# Patient Record
Sex: FEMALE | Race: WHITE | NOT HISPANIC OR LATINO | URBAN - METROPOLITAN AREA
[De-identification: names, ages, dates, MRNs, and addresses within clinical notes are randomized per-mention and may not be internally consistent; named-entity substitution may affect disease eponyms.]

---

## 2019-11-30 ENCOUNTER — EMERGENCY (EMERGENCY)
Facility: HOSPITAL | Age: 44
LOS: 0 days | Discharge: HOME | End: 2019-11-30
Admitting: EMERGENCY MEDICINE
Payer: COMMERCIAL

## 2019-11-30 VITALS
HEIGHT: 65 IN | RESPIRATION RATE: 16 BRPM | OXYGEN SATURATION: 98 % | DIASTOLIC BLOOD PRESSURE: 103 MMHG | HEART RATE: 90 BPM | WEIGHT: 214.95 LBS | SYSTOLIC BLOOD PRESSURE: 173 MMHG | TEMPERATURE: 99 F
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
## 2019-11-30 DIAGNOSIS — R07.89 OTHER CHEST PAIN: ICD-10-CM
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
## 2019-11-30 PROCEDURE — 71046 X-RAY EXAM CHEST 2 VIEWS: CPT | Mod: 26

## 2019-11-30 PROCEDURE — 99284 EMERGENCY DEPT VISIT MOD MDM: CPT

## 2019-11-30 RX ORDER — IBUPROFEN 200 MG
600 TABLET ORAL ONCE
Refills: 0 | Status: COMPLETED | OUTPATIENT
Start: 2019-11-30 | End: 2019-11-30

## 2019-11-30 RX ADMIN — Medication 600 MILLIGRAM(S): at 21:17
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
## 2019-11-30 NOTE — ED PROVIDER NOTE - NS ED ROS FT
Review of Systems:  	•	CONSTITUTIONAL - no fever, no diaphoresis, no chills  	•	SKIN - no rash  	•	HEMATOLOGIC - no bleeding, no bruising  	•	EYES - no eye pain, no blurry vision  	•	ENT - no change in hearing, no sore throat, no ear pain or tinnitus  	•	RESPIRATORY - no shortness of breath, no cough  	•	CARDIAC - no palpitations  	•	GI - no abd pain, no nausea, no vomiting, no diarrhea, no constipation  	•	GENITO-URINARY - no discharge, no dysuria; no hematuria, no increased urinary frequency  	•	MUSCULOSKELETAL - + midsternal chest pain  	•	NEUROLOGIC - no weakness, no headache, no paresthesias, no LOC

## 2019-11-30 NOTE — ED PROVIDER NOTE - PHYSICAL EXAMINATION
CONST: Well appearing in NAD  EYES: PERRL, EOMI, Sclera and conjunctiva clear.   ENT: No nasal discharge. Oropharynx normal appearing  NECK: Non-tender, no meningeal signs. normal ROM. supple   CARD: Normal S1 S2; Normal rate and rhythm  RESP: Equal BS B/L, No wheezes, rhonchi or rales. No distress  GI: Soft, non-tender, non-distended. no cva tenderness. normal BS  MS: Normal ROM in all extremities. No midline spinal tenderness. pulses 2 +. no calf tenderness or swelling  SKIN: Warm, dry, no acute rashes. Good turgor  NEURO: A&Ox3, No focal deficits. Strength 5/5 with no sensory deficits. Steady gait. Finger to nose intact. Negative pronator drift

## 2019-11-30 NOTE — ED PROVIDER NOTE - PATIENT PORTAL LINK FT
You can access the FollowMyHealth Patient Portal offered by [facility] by registering at the following website: [email or web address]. By joining [facility]’s FollowMyHealth portal, you will also be able to view your health information using other applications (apps) compatible with our system.
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
## 2019-11-30 NOTE — ED ADULT NURSE NOTE - NSIMPLEMENTINTERV_GEN_ALL_ED
Implemented All Universal Safety Interventions:  [place] to call system. Call bell, personal items and telephone within reach. Instruct patient to call for assistance. Room bathroom lighting operational. Non-slip footwear when patient is off stretcher. Physically safe environment: no spills, clutter or unnecessary equipment. Stretcher in lowest position, wheels locked, appropriate side rails in place.

## 2021-04-02 ENCOUNTER — INPATIENT (INPATIENT)
Facility: HOSPITAL | Age: 46
LOS: 7 days | Discharge: LEFT BEFORE DISCHARGE | End: 2021-04-10
Attending: INTERNAL MEDICINE | Admitting: INTERNAL MEDICINE
Payer: COMMERCIAL

## 2021-04-02 VITALS
DIASTOLIC BLOOD PRESSURE: 78 MMHG | RESPIRATION RATE: 19 BRPM | OXYGEN SATURATION: 95 % | HEIGHT: 65 IN | HEART RATE: 97 BPM | SYSTOLIC BLOOD PRESSURE: 112 MMHG

## 2021-04-02 LAB
ACANTHOCYTES BLD QL SMEAR: SLIGHT — SIGNIFICANT CHANGE UP
ALBUMIN SERPL ELPH-MCNC: 3.9 G/DL — SIGNIFICANT CHANGE UP (ref 3.5–5.2)
ALP SERPL-CCNC: 60 U/L — SIGNIFICANT CHANGE UP (ref 30–115)
ALT FLD-CCNC: 134 U/L — HIGH (ref 0–41)
ANION GAP SERPL CALC-SCNC: 14 MMOL/L — SIGNIFICANT CHANGE UP (ref 7–14)
AST SERPL-CCNC: 208 U/L — HIGH (ref 0–41)
BASOPHILS # BLD AUTO: 0 K/UL — SIGNIFICANT CHANGE UP (ref 0–0.2)
BASOPHILS NFR BLD AUTO: 0 % — SIGNIFICANT CHANGE UP (ref 0–1)
BILIRUB SERPL-MCNC: <0.2 MG/DL — SIGNIFICANT CHANGE UP (ref 0.2–1.2)
BUN SERPL-MCNC: 10 MG/DL — SIGNIFICANT CHANGE UP (ref 10–20)
CALCIUM SERPL-MCNC: 8.5 MG/DL — SIGNIFICANT CHANGE UP (ref 8.5–10.1)
CHLORIDE SERPL-SCNC: 102 MMOL/L — SIGNIFICANT CHANGE UP (ref 98–110)
CO2 SERPL-SCNC: 24 MMOL/L — SIGNIFICANT CHANGE UP (ref 17–32)
CREAT SERPL-MCNC: 0.7 MG/DL — SIGNIFICANT CHANGE UP (ref 0.7–1.5)
D DIMER BLD IA.RAPID-MCNC: 167 NG/ML DDU — SIGNIFICANT CHANGE UP (ref 0–230)
EOSINOPHIL # BLD AUTO: 0 K/UL — SIGNIFICANT CHANGE UP (ref 0–0.7)
EOSINOPHIL NFR BLD AUTO: 0 % — SIGNIFICANT CHANGE UP (ref 0–8)
GIANT PLATELETS BLD QL SMEAR: PRESENT — SIGNIFICANT CHANGE UP
GLUCOSE SERPL-MCNC: 145 MG/DL — HIGH (ref 70–99)
HCT VFR BLD CALC: 43 % — SIGNIFICANT CHANGE UP (ref 37–47)
HGB BLD-MCNC: 13.8 G/DL — SIGNIFICANT CHANGE UP (ref 12–16)
LYMPHOCYTES # BLD AUTO: 0.35 K/UL — LOW (ref 1.2–3.4)
LYMPHOCYTES # BLD AUTO: 11.3 % — LOW (ref 20.5–51.1)
MANUAL SMEAR VERIFICATION: SIGNIFICANT CHANGE UP
MCHC RBC-ENTMCNC: 27.9 PG — SIGNIFICANT CHANGE UP (ref 27–31)
MCHC RBC-ENTMCNC: 32.1 G/DL — SIGNIFICANT CHANGE UP (ref 32–37)
MCV RBC AUTO: 87 FL — SIGNIFICANT CHANGE UP (ref 81–99)
MONOCYTES # BLD AUTO: 0.05 K/UL — LOW (ref 0.1–0.6)
MONOCYTES NFR BLD AUTO: 1.7 % — SIGNIFICANT CHANGE UP (ref 1.7–9.3)
NEUTROPHILS # BLD AUTO: 2.64 K/UL — SIGNIFICANT CHANGE UP (ref 1.4–6.5)
NEUTROPHILS NFR BLD AUTO: 84.4 % — HIGH (ref 42.2–75.2)
PLAT MORPH BLD: NORMAL — SIGNIFICANT CHANGE UP
PLATELET # BLD AUTO: 200 K/UL — SIGNIFICANT CHANGE UP (ref 130–400)
POTASSIUM SERPL-MCNC: 4.2 MMOL/L — SIGNIFICANT CHANGE UP (ref 3.5–5)
POTASSIUM SERPL-SCNC: 4.2 MMOL/L — SIGNIFICANT CHANGE UP (ref 3.5–5)
PROT SERPL-MCNC: 6.7 G/DL — SIGNIFICANT CHANGE UP (ref 6–8)
RBC # BLD: 4.94 M/UL — SIGNIFICANT CHANGE UP (ref 4.2–5.4)
RBC # FLD: 13.6 % — SIGNIFICANT CHANGE UP (ref 11.5–14.5)
RBC BLD AUTO: NORMAL — SIGNIFICANT CHANGE UP
SARS-COV-2 RNA SPEC QL NAA+PROBE: DETECTED
SODIUM SERPL-SCNC: 140 MMOL/L — SIGNIFICANT CHANGE UP (ref 135–146)
VARIANT LYMPHS # BLD: 2.6 % — SIGNIFICANT CHANGE UP (ref 0–5)
WBC # BLD: 3.13 K/UL — LOW (ref 4.8–10.8)
WBC # FLD AUTO: 3.13 K/UL — LOW (ref 4.8–10.8)

## 2021-04-02 PROCEDURE — 99223 1ST HOSP IP/OBS HIGH 75: CPT | Mod: AI

## 2021-04-02 PROCEDURE — 93010 ELECTROCARDIOGRAM REPORT: CPT

## 2021-04-02 PROCEDURE — 99285 EMERGENCY DEPT VISIT HI MDM: CPT

## 2021-04-02 PROCEDURE — 71045 X-RAY EXAM CHEST 1 VIEW: CPT | Mod: 26

## 2021-04-02 RX ORDER — DEXAMETHASONE 0.5 MG/5ML
6 ELIXIR ORAL DAILY
Refills: 0 | Status: DISCONTINUED | OUTPATIENT
Start: 2021-04-02 | End: 2021-04-03

## 2021-04-02 RX ORDER — ACETAMINOPHEN 500 MG
975 TABLET ORAL ONCE
Refills: 0 | Status: COMPLETED | OUTPATIENT
Start: 2021-04-02 | End: 2021-04-02

## 2021-04-02 RX ORDER — ACETAMINOPHEN 500 MG
650 TABLET ORAL EVERY 6 HOURS
Refills: 0 | Status: DISCONTINUED | OUTPATIENT
Start: 2021-04-02 | End: 2021-04-10

## 2021-04-02 RX ORDER — PANTOPRAZOLE SODIUM 20 MG/1
40 TABLET, DELAYED RELEASE ORAL
Refills: 0 | Status: DISCONTINUED | OUTPATIENT
Start: 2021-04-02 | End: 2021-04-03

## 2021-04-02 RX ORDER — ENOXAPARIN SODIUM 100 MG/ML
40 INJECTION SUBCUTANEOUS AT BEDTIME
Refills: 0 | Status: DISCONTINUED | OUTPATIENT
Start: 2021-04-02 | End: 2021-04-03

## 2021-04-02 RX ORDER — CHLORHEXIDINE GLUCONATE 213 G/1000ML
1 SOLUTION TOPICAL
Refills: 0 | Status: DISCONTINUED | OUTPATIENT
Start: 2021-04-02 | End: 2021-04-10

## 2021-04-02 RX ADMIN — Medication 975 MILLIGRAM(S): at 16:19

## 2021-04-02 RX ADMIN — ENOXAPARIN SODIUM 40 MILLIGRAM(S): 100 INJECTION SUBCUTANEOUS at 23:15

## 2021-04-02 RX ADMIN — Medication 975 MILLIGRAM(S): at 13:50

## 2021-04-02 RX ADMIN — Medication 6 MILLIGRAM(S): at 16:58

## 2021-04-02 NOTE — ED PROVIDER NOTE - ATTENDING CONTRIBUTION TO CARE
45F p/w fever with Tm 103 and SOB that started 1 week ago. Reports loose diarrhea for a few days and 1 episode of vomiting yesterday nbnb. Denies abd pain, flank pain, urinary sx. pt desaturates ton mid-80s on RA, improved to 89-91% on 4.5L NC.     Gen - pt not ill appearing, in NAD, Head - NCAT, Pharynx - clear, MMM, Heart - RRR, no m/g/r, Lungs - CTAB, no w/c/r, Abdomen - soft, NT, ND, Skin - No rash, Extremities - FROM, no edema, erythema, ecchymosis, brisk cap refill, Neuro - nl strength and sensation, nl gait.     a/p: will eval for hypoxia 2/2 covid? ekg, cxr, labs, covid swab sent, will give ivf, decadron, and dispo admit

## 2021-04-02 NOTE — ED PROVIDER NOTE - CLINICAL SUMMARY MEDICAL DECISION MAKING FREE TEXT BOX
45F p/w fever with Tm 103 and SOB that started 1 week ago. Reports loose diarrhea for a few days and 1 episode of vomiting yesterday nbnb. Denies abd pain, flank pain, urinary sx. pt desaturates ton mid-80s on RA, improved to 89-91% on 4.5L NC. Labs and imaging reviewed, b/l opacities on cxr. Decadron given and pt admitted for hypoxia d/t covid pneumonia.

## 2021-04-02 NOTE — H&P ADULT - NSHPPHYSICALEXAM_GEN_ALL_CORE
General: NAD  Head and Neck: NC, AT, No JVD   Heart: S1, S2 appreciated, no added sounds  Lung: Bilateral equal breath sounds, no added sounds   Abdomen: Soft, nontender, nondistended, BS +ve  LE: no edema   Neuro: nonfocal   Psych: normal affect

## 2021-04-02 NOTE — H&P ADULT - NSHPLABSRESULTS_GEN_ALL_CORE
13.8   3.13  )-----------( 200      ( 02 Apr 2021 14:20 )             43.0       04-02    140  |  102  |  10  ----------------------------<  145<H>  4.2   |  24  |  0.7    Ca    8.5      02 Apr 2021 14:20    TPro  6.7  /  Alb  3.9  /  TBili  <0.2  /  DBili  x   /  AST  208<H>  /  ALT  134<H>  /  AlkPhos  60  04-02                      Lactate Trend            CAPILLARY BLOOD GLUCOSE

## 2021-04-02 NOTE — ED PROVIDER NOTE - NS ED ROS FT
Constitutional: (+) fever  Eyes/ENT: (-) visual changes   Cardiovascular: (-) chest pain, (-) syncope  Respiratory: (+) cough, (+) shortness of breath  Gastrointestinal: (-) vomiting, (-) diarrhea  Genitourinary: (-) dysuria, (-) hesitancy, (-) frequency   Musculoskeletal: (-) neck pain, (-) back pain, (-) joint pain  Integumentary: (-) rash, (-) edema  Neurological: (-) headache, (-) altered mental status  Allergic/Immunologic: (-) pruritus

## 2021-04-02 NOTE — ED PROVIDER NOTE - OBJECTIVE STATEMENT
44 yo female with no pertinent pmh presents c/o SOB. pt states to noted symptoms of fever/cough on 3/26 and tested covid+ on 3/28. pt states to experience SOB yesterday and was noted to be hypoxic at urgent care so sent over to the ED. pt denies any other symptoms including headache, abdominal pain, n/v/d, chest pain.

## 2021-04-02 NOTE — ED PROVIDER NOTE - PHYSICAL EXAMINATION
Gen: NAD, AOx3  Head: NCAT  HEENT: PERRL, oral mucosa moist, normal conjunctiva, oropharynx clear without exudate or erythema  Lung: CTAB, no respiratory distress, no wheezing, rales, rhonchi, spO2 83% at rest on RA  CV: normal s1/s2, rrr, Normal perfusion, pulses 2+ throughout  Abd: soft, NTND, no CVA tenderness  Genitourinary: no pelvic tenderness  MSK: No edema, no visible deformities, full range of motion in all 4 extremities  Neuro: No focal neurologic deficits  Skin: No rash   Psych: normal affect

## 2021-04-02 NOTE — H&P ADULT - ATTENDING COMMENTS
46 YO F with a PMH of COVID19 (3/28) who was [place] [facility] in the 80's on RA, she originally presented there with a c/o progressively worsening SOB for the past x 1 week. Associated with fevers, N/V, and non-productive cough. Denies any runny nose, sore throat, rashes, CP, palpitations, LE swelling, and dysuria. - sick contacts. - recent travel.     In the ED, Chest X-ray with B/L interstitial opacities (pending official read). Hypoxic to mid 80's on RA, placed on NC. Isolated and COVID19 swab was positive.     Physical exam shows pt in NAD. VSS, afebrile, not hypoxic on 8L NC. A&Ox3. Non-focal neuro exam. Muscle strength/sensation intact. CTA B/L with no W/C/R. RRR, no M/G/R. ABD is soft and non-tender, normoactive BSs. LEs without swelling. No rashes. Labs and radiology as above.     SOB + Fevers + Cough due to COVID19 pneumonia + acute hypoxic respiratory failure, sepsis present on admission (Temp + WBC + source + end organ [AHRF]). - recent travel. - COVID19 contact. Admit to COVID19 isolation unit. Send Coags, CRP, procal, D-dimer, and LDH. Trend CBC, Ck, and LFTs. Send ferritin and fibrinogen. FU official Chest XR report. IV Steroids. Remdesivir/Plasma protocol. Plasma Abs. APAP PRN. Anti-tussives PRN. Supplemental O2 PRN. Prone pt as tolerated. AC as per [facility] COVID protocol. ID Consult.     Lymphocytopenia and transaminitis, from above. Management as above.     Hyperglycemia. A1c. FSs. Insulin PRN.     Restart home meds, except as stated above. DVT PPX. Inform PCP of pt's admission to hospital. My note supersedes the residents note.

## 2021-04-02 NOTE — H&P ADULT - ASSESSMENT
This is a 45 year old F patient with no pertinent past medical Hx presented to the ED for shortness of breath of 1 week duration.    #Acute hypoxic respiratory failure secondary to COVID PNA  -Patient was positive for COVID-19 on Sunday 03/28  -Patient SpO2 was 83% at rest, 95% on 6L NC  -Chest xray showed Bilateral intersitial infiltrate with no effusion   -Start Decadron 6 mg PO daily for 10 days   -Follow up inflammatory markers   -Titrate O2 as tolerated   -ID consult     #DVT ppx: Lovenox 40 mg SubQ daily   #GI ppx: Protonix   #Diet: Regular   #Acute

## 2021-04-02 NOTE — H&P ADULT - HISTORY OF PRESENT ILLNESS
This is a 45 year old F patient with no pertinent past medical Hx presented to the ED for shortness of breath of 1 week duration. Patient was doing well until one week prior to admission when she started to experience exertional shortness of breath on friday 03/26/2021. Patient stated that the dyspnea got progressively worse but is mostly exertional. It was associated with fever (undocumented) and nonproductive cough. Patient was tested for COVID and it was positive two days after the onset of symptoms. she also experienced one episode of vomiting and was having diarrhea for 2 days. Patient denied any chest pain except when she has a spell of cough that resolve completely after the cough stops. Patient denied any lightheadedness, headache or any other symptoms. Patient went to Urgent care on the day of admission and was found to have SpO2 of 83% so she was sent to the ED for further management.     In the ED Vital Signs Last 24 Hrs  T(C): 37.2 (02 Apr 2021 16:18), Max: 38.6 (02 Apr 2021 13:43)  T(F): 99 (02 Apr 2021 16:18), Max: 101.5 (02 Apr 2021 13:43)  HR: 88 (02 Apr 2021 16:18) (88 - 97)  BP: 119/75 (02 Apr 2021 16:18) (112/78 - 119/75)  BP(mean): --  RR: 19 (02 Apr 2021 16:18) (19 - 19)  SpO2: 93% (02 Apr 2021 16:18) (93% - 95%)

## 2021-04-03 LAB
ALBUMIN SERPL ELPH-MCNC: 3.9 G/DL — SIGNIFICANT CHANGE UP (ref 3.5–5.2)
ALP SERPL-CCNC: 61 U/L — SIGNIFICANT CHANGE UP (ref 30–115)
ALT FLD-CCNC: 107 U/L — HIGH (ref 0–41)
ANION GAP SERPL CALC-SCNC: 12 MMOL/L — SIGNIFICANT CHANGE UP (ref 7–14)
APTT BLD: 36.1 SEC — SIGNIFICANT CHANGE UP (ref 27–39.2)
AST SERPL-CCNC: 127 U/L — HIGH (ref 0–41)
BASE EXCESS BLDA CALC-SCNC: 5.3 MMOL/L — HIGH (ref -2–2)
BILIRUB SERPL-MCNC: 0.2 MG/DL — SIGNIFICANT CHANGE UP (ref 0.2–1.2)
BLD GP AB SCN SERPL QL: SIGNIFICANT CHANGE UP
BUN SERPL-MCNC: 17 MG/DL — SIGNIFICANT CHANGE UP (ref 10–20)
CALCIUM SERPL-MCNC: 8.5 MG/DL — SIGNIFICANT CHANGE UP (ref 8.5–10.1)
CHLORIDE SERPL-SCNC: 100 MMOL/L — SIGNIFICANT CHANGE UP (ref 98–110)
CO2 SERPL-SCNC: 29 MMOL/L — SIGNIFICANT CHANGE UP (ref 17–32)
COVID-19 SPIKE DOMAIN AB INTERP: NEGATIVE — SIGNIFICANT CHANGE UP
COVID-19 SPIKE DOMAIN ANTIBODY RESULT: 0.4 U/ML — SIGNIFICANT CHANGE UP
CREAT SERPL-MCNC: 0.8 MG/DL — SIGNIFICANT CHANGE UP (ref 0.7–1.5)
CRP SERPL-MCNC: 73 MG/L — HIGH
FERRITIN SERPL-MCNC: 3872 NG/ML — HIGH (ref 15–150)
GAS PNL BLDA: SIGNIFICANT CHANGE UP
GLUCOSE SERPL-MCNC: 246 MG/DL — HIGH (ref 70–99)
HCO3 BLDA-SCNC: 30 MMOL/L — HIGH (ref 23–27)
HCT VFR BLD CALC: 41.1 % — SIGNIFICANT CHANGE UP (ref 37–47)
HGB BLD-MCNC: 13.2 G/DL — SIGNIFICANT CHANGE UP (ref 12–16)
HOROWITZ INDEX BLDA+IHG-RTO: 100 — SIGNIFICANT CHANGE UP
INR BLD: 1.07 RATIO — SIGNIFICANT CHANGE UP (ref 0.65–1.3)
MCHC RBC-ENTMCNC: 27.7 PG — SIGNIFICANT CHANGE UP (ref 27–31)
MCHC RBC-ENTMCNC: 32.1 G/DL — SIGNIFICANT CHANGE UP (ref 32–37)
MCV RBC AUTO: 86.3 FL — SIGNIFICANT CHANGE UP (ref 81–99)
NRBC # BLD: 0 /100 WBCS — SIGNIFICANT CHANGE UP (ref 0–0)
PCO2 BLDA: 41 MMHG — SIGNIFICANT CHANGE UP (ref 38–42)
PH BLDA: 7.47 — HIGH (ref 7.38–7.42)
PLATELET # BLD AUTO: 251 K/UL — SIGNIFICANT CHANGE UP (ref 130–400)
PO2 BLDA: 78 MMHG — SIGNIFICANT CHANGE UP (ref 78–95)
POTASSIUM SERPL-MCNC: 4.9 MMOL/L — SIGNIFICANT CHANGE UP (ref 3.5–5)
POTASSIUM SERPL-SCNC: 4.9 MMOL/L — SIGNIFICANT CHANGE UP (ref 3.5–5)
PROCALCITONIN SERPL-MCNC: 0.3 NG/ML — HIGH (ref 0.02–0.1)
PROT SERPL-MCNC: 6.7 G/DL — SIGNIFICANT CHANGE UP (ref 6–8)
PROTHROM AB SERPL-ACNC: 12.3 SEC — SIGNIFICANT CHANGE UP (ref 9.95–12.87)
RBC # BLD: 4.76 M/UL — SIGNIFICANT CHANGE UP (ref 4.2–5.4)
RBC # FLD: 13.6 % — SIGNIFICANT CHANGE UP (ref 11.5–14.5)
SAO2 % BLDA: 97 % — SIGNIFICANT CHANGE UP (ref 94–98)
SARS-COV-2 IGG+IGM SERPL QL IA: 0.4 U/ML — SIGNIFICANT CHANGE UP
SARS-COV-2 IGG+IGM SERPL QL IA: NEGATIVE — SIGNIFICANT CHANGE UP
SODIUM SERPL-SCNC: 141 MMOL/L — SIGNIFICANT CHANGE UP (ref 135–146)
WBC # BLD: 2.74 K/UL — LOW (ref 4.8–10.8)
WBC # FLD AUTO: 2.74 K/UL — LOW (ref 4.8–10.8)

## 2021-04-03 PROCEDURE — 99233 SBSQ HOSP IP/OBS HIGH 50: CPT

## 2021-04-03 PROCEDURE — 71045 X-RAY EXAM CHEST 1 VIEW: CPT | Mod: 26

## 2021-04-03 RX ORDER — TOCILIZUMAB 20 MG/ML
800 INJECTION, SOLUTION, CONCENTRATE INTRAVENOUS ONCE
Refills: 0 | Status: COMPLETED | OUTPATIENT
Start: 2021-04-03 | End: 2021-04-03

## 2021-04-03 RX ORDER — GUAIFENESIN/DEXTROMETHORPHAN 600MG-30MG
10 TABLET, EXTENDED RELEASE 12 HR ORAL EVERY 6 HOURS
Refills: 0 | Status: COMPLETED | OUTPATIENT
Start: 2021-04-03 | End: 2021-04-08

## 2021-04-03 RX ORDER — IBUPROFEN 200 MG
600 TABLET ORAL EVERY 6 HOURS
Refills: 0 | Status: DISCONTINUED | OUTPATIENT
Start: 2021-04-03 | End: 2021-04-09

## 2021-04-03 RX ORDER — REMDESIVIR 5 MG/ML
100 INJECTION INTRAVENOUS EVERY 24 HOURS
Refills: 0 | Status: COMPLETED | OUTPATIENT
Start: 2021-04-04 | End: 2021-04-07

## 2021-04-03 RX ORDER — ENOXAPARIN SODIUM 100 MG/ML
40 INJECTION SUBCUTANEOUS
Refills: 0 | Status: DISCONTINUED | OUTPATIENT
Start: 2021-04-03 | End: 2021-04-10

## 2021-04-03 RX ORDER — REMDESIVIR 5 MG/ML
INJECTION INTRAVENOUS
Refills: 0 | Status: COMPLETED | OUTPATIENT
Start: 2021-04-03 | End: 2021-04-07

## 2021-04-03 RX ORDER — REMDESIVIR 5 MG/ML
200 INJECTION INTRAVENOUS EVERY 24 HOURS
Refills: 0 | Status: COMPLETED | OUTPATIENT
Start: 2021-04-03 | End: 2021-04-03

## 2021-04-03 RX ORDER — DEXAMETHASONE 0.5 MG/5ML
6 ELIXIR ORAL DAILY
Refills: 0 | Status: DISCONTINUED | OUTPATIENT
Start: 2021-04-03 | End: 2021-04-10

## 2021-04-03 RX ADMIN — REMDESIVIR 500 MILLIGRAM(S): 5 INJECTION INTRAVENOUS at 17:32

## 2021-04-03 RX ADMIN — Medication 10 MILLILITER(S): at 15:21

## 2021-04-03 RX ADMIN — Medication 6 MILLIGRAM(S): at 05:01

## 2021-04-03 RX ADMIN — Medication 10 MILLILITER(S): at 22:08

## 2021-04-03 RX ADMIN — TOCILIZUMAB 100 MILLIGRAM(S): 20 INJECTION, SOLUTION, CONCENTRATE INTRAVENOUS at 15:08

## 2021-04-03 RX ADMIN — Medication 1 TABLET(S): at 15:22

## 2021-04-03 RX ADMIN — ENOXAPARIN SODIUM 40 MILLIGRAM(S): 100 INJECTION SUBCUTANEOUS at 17:32

## 2021-04-03 RX ADMIN — Medication 6 MILLIGRAM(S): at 17:33

## 2021-04-03 RX ADMIN — PANTOPRAZOLE SODIUM 40 MILLIGRAM(S): 20 TABLET, DELAYED RELEASE ORAL at 05:01

## 2021-04-03 RX ADMIN — Medication 10 MILLILITER(S): at 17:33

## 2021-04-03 NOTE — PROGRESS NOTE ADULT - SUBJECTIVE AND OBJECTIVE BOX
SOSA COLLADO  [facility]-N T1-3A 031 A ([facility] T1-3A)            Patient was evaluated and examined  by bedside, dyspneic at rest, desaturated to 87% on 6 L , placed on NRB 15 L sat 93-94%. c/o mild dry cough. no chest pain, no palpitations. spiked fever.          REVIEW OF SYSTEMS:  please see pertinent positives mentioned above, all other 12 ROS negative      T(C): , Max: 38.6 (04-02-21 @ 13:43)  HR: 87 (04-03-21 @ 05:00)  BP: 109/70 (04-03-21 @ 05:00)  RR: 18 (04-03-21 @ 05:00)  SpO2: 94% (04-03-21 @ 05:00)  CAPILLARY BLOOD GLUCOSE          PHYSICAL EXAM:  General: NAD, AAOX3, patient is laying comfortably in bed  HEENT: AT, NC, Supple, NO JVD, NO CB  Lungs: decreased breath sounds  B/L, no wheezing, no rhonchi  CVS: normal S1, S2, RRR, NO M/G/R  Abdomen: soft, bowel sounds present, non-tender, non-distended  Extremities: no edema, no clubbing, no cyanosis, positive peripheral pulses b/l  Neuro: no acute focal neurological deficits, mild generalized body weakness  Skin: no rash, no ecchymosis      LAB  CBC  Date: 04-03-21 @ 09:04  Mean cell Kdvbmktzoa33.7  Mean cell Hemoglobin Conc32.1  Mean cell Volum 86.3  Platelet count-Automate 251  RBC Count 4.76  Red Cell Distrib Width13.6  WBC Count2.74  % Albumin, Urine--  Hematocrit 41.1  Hemoglobin 13.2  CBC  Date: 04-02-21 @ 14:20  Mean cell Irhtptvwvn04.9  Mean cell Hemoglobin Conc32.1  Mean cell Volum 87.0  Platelet count-Automate 200  RBC Count 4.94  Red Cell Distrib Width13.6  WBC Count3.13  % Albumin, Urine--  Hematocrit 43.0  Hemoglobin 13.8    BMP  04-03-21 @ 09:04  Blood Gas Arterial-Calcium,Ionized--  Blood Urea Nitrogen, Serum 17 mg/dL [10 - 20]  Carbon Dioxide, Serum29 mmol/L [17 - 32]  Chloride, Rjeyd117 mmol/L [98 - 110]  Creatinie, Serum0.8 mg/dL [0.7 - 1.5]  Glucose, Gsnkf577 mg/dL<H> [70 - 99]  Potassium, Serum4.9 mmol/L [3.5 - 5.0]  Sodium, Serum 141 mmol/L [135 - 146]  BMP  04-02-21 @ 14:20  Blood Gas Arterial-Calcium,Ionized--  Blood Urea Nitrogen, Serum 10 mg/dL [10 - 20]  Carbon Dioxide, Serum24 mmol/L [17 - 32]  Chloride, Mbmjm350 mmol/L [98 - 110]  Creatinie, Serum0.7 mg/dL [0.7 - 1.5]  Glucose, Gczno201 mg/dL<H> [70 - 99]  Potassium, Serum4.2 mmol/L [3.5 - 5.0]  Sodium, Serum 140 mmol/L [135 - 146]        PT/INR - ( 03 Apr 2021 09:04 )   PT: 12.30 sec;   INR: 1.07 ratio         PTT - ( 03 Apr 2021 09:04 )  PTT:36.1 sec        Medications:  acetaminophen    Suspension .. 650 milliGRAM(s) Oral every 6 hours PRN  chlorhexidine 4% Liquid 1 Application(s) Topical <User Schedule>  dexAMETHasone     Tablet 6 milliGRAM(s) Oral daily  enoxaparin Injectable 40 milliGRAM(s) SubCutaneous at bedtime  guaifenesin/dextromethorphan  Syrup 10 milliLiter(s) Oral every 6 hours  ibuprofen  Tablet. 600 milliGRAM(s) Oral every 6 hours PRN  pantoprazole    Tablet 40 milliGRAM(s) Oral before breakfast  remdesivir  IVPB   IV Intermittent         Assessment and Plan:  Patient is a 45 year old Female  patient with no pertinent past medical Hx presented to the ED for shortness of breath of 1 week duration. dx. with hypoxia due to covid viral Pneumonia    #Acute hypoxic respiratory failure secondary to COVID  19 viral Pneumonia/ cytokine storm  -Patient was positive for COVID-19 on Sunday 03/28  -4/3- worsening hypoxia, placed on NRB 15 L, will obtain ABG, repeat CXR.   -as per ID started on IV Remdesivir (day 1/5) , Dexamethazone ( day 2/10)  -Chest xray showed extensive  Bilateral intersitial infiltrate with no effusion   -ddimer-167, CRP- 73, Ferritin 3872,  procal- 0.3  -repeat  inflammatory markers on 4/4    # Acute transaminitis- due to viral covid infection, continue to trend LFT's. if increases 5 fold than d/c Remdesivir.    # Leukopenia - due to covid infection, continue to monitor CBC    #DVT ppx: Lovenox 40 mg SubQ twice daily if BMI 30 or above.      #GI ppx: not recommended  #Diet: Regular     #Progress Note Handoff: Patient with rapidly progressive hypoxia, now on NRB tx., f/up CXR, ABG patient developing cytokine storm., f/up ID if patient is a candidate for toci today.   Family discussion: medical plan of tx. d/w pt. by bedside Disposition: Home__once medically stable       SOSA COLLADO  [facility] T1-3A 031 A ([facility] T1-3A)            Patient was evaluated and examined  by bedside, dyspneic at rest, desaturated to 87% on 6 L , placed on NRB 15 L sat 93-94%. c/o mild dry cough. no chest pain, no palpitations. spiked fever.          REVIEW OF SYSTEMS:  please see pertinent positives mentioned above, all other 12 ROS negative      T(C): , Max: 38.6 (04-02-21 @ 13:43)  HR: 87 (04-03-21 @ 05:00)  BP: 109/70 (04-03-21 @ 05:00)  RR: 18 (04-03-21 @ 05:00)  SpO2: 94% (04-03-21 @ 05:00)  CAPILLARY BLOOD GLUCOSE          PHYSICAL EXAM:  General: NAD, AAOX3, patient is laying comfortably in bed  HEENT: AT, NC, Supple, NO JVD, NO CB  Lungs: decreased breath sounds  B/L, no wheezing, no rhonchi  CVS: normal S1, S2, RRR, NO M/G/R  Abdomen: soft, bowel sounds present, non-tender, non-distended  Extremities: no edema, no clubbing, no cyanosis, positive peripheral pulses b/l  Neuro: no acute focal neurological deficits, mild generalized body weakness  Skin: no rash, no ecchymosis      LAB  CBC  Date: 04-03-21 @ 09:04  Mean cell Gnopshbewv67.7  Mean cell Hemoglobin Conc32.1  Mean cell Volum 86.3  Platelet count-Automate 251  RBC Count 4.76  Red Cell Distrib Width13.6  WBC Count2.74  % Albumin, Urine--  Hematocrit 41.1  Hemoglobin 13.2  CBC  Date: 04-02-21 @ 14:20  Mean cell Ccuysnivjv54.9  Mean cell Hemoglobin Conc32.1  Mean cell Volum 87.0  Platelet count-Automate 200  RBC Count 4.94  Red Cell Distrib Width13.6  WBC Count3.13  % Albumin, Urine--  Hematocrit 43.0  Hemoglobin 13.8    BMP  04-03-21 @ 09:04  Blood Gas Arterial-Calcium,Ionized--  Blood Urea Nitrogen, Serum 17 mg/dL [10 - 20]  Carbon Dioxide, Serum29 mmol/L [17 - 32]  Chloride, Hgnha777 mmol/L [98 - 110]  Creatinie, Serum0.8 mg/dL [0.7 - 1.5]  Glucose, Gqjkb391 mg/dL<H> [70 - 99]  Potassium, Serum4.9 mmol/L [3.5 - 5.0]  Sodium, Serum 141 mmol/L [135 - 146]  [facility]  04-02-21 @ 14:20  Blood Gas Arterial-Calcium,Ionized--  Blood Urea Nitrogen, Serum 10 mg/dL [10 - 20]  Carbon Dioxide, Serum24 mmol/L [17 - 32]  Chloride, Kdmou370 mmol/L [98 - 110]  Creatinie, Serum0.7 mg/dL [0.7 - 1.5]  Glucose, Vjjxw237 mg/dL<H> [70 - 99]  Potassium, Serum4.2 mmol/L [3.5 - 5.0]  Sodium, Serum 140 mmol/L [135 - 146]        PT/INR - ( 03 Apr 2021 09:04 )   PT: 12.30 sec;   INR: 1.07 ratio         PTT - ( 03 Apr 2021 09:04 )  PTT:36.1 sec        Medications:  acetaminophen    Suspension .. 650 milliGRAM(s) Oral every 6 hours PRN  chlorhexidine 4% Liquid 1 Application(s) Topical <User Schedule>  dexAMETHasone     Tablet 6 milliGRAM(s) Oral daily  enoxaparin Injectable 40 milliGRAM(s) SubCutaneous at bedtime  guaifenesin/dextromethorphan  Syrup 10 milliLiter(s) Oral every 6 hours  ibuprofen  Tablet. 600 milliGRAM(s) Oral every 6 hours PRN  pantoprazole    Tablet 40 milliGRAM(s) Oral before breakfast  remdesivir  IVPB   IV Intermittent         Assessment and Plan:  Patient is a 45 year old Female  patient with no pertinent past medical Hx presented to the ED for shortness of breath of 1 week duration. dx. with hypoxia due to covid viral Pneumonia    #Acute hypoxic respiratory failure secondary to COVID  19 viral Pneumonia/ cytokine storm  -Patient was positive for COVID-19 on Sunday 03/28  -4/3- worsening hypoxia, placed on NRB 15 L, will obtain ABG, repeat CXR.   -as per ID started on IV Remdesivir (day 1/5) , Dexamethazone ( day 2/10), will give 1 dose of toci today  -Chest xray showed extensive  Bilateral intersitial infiltrate with no effusion   -ddimer-167, CRP- 73, Ferritin 3872,  procal- 0.3  -repeat  inflammatory markers on 4/4    # Acute transaminitis- due to viral covid infection, continue to trend LFT's. if increases 5 fold than d/c Remdesivir.    # Leukopenia - due to covid infection, continue to monitor CBC    #DVT ppx: Lovenox 40 mg SubQ twice daily,  BMI above  30      #GI ppx: not recommended  #Diet: Regular     #Progress Note Handoff: Patient with rapidly progressive hypoxia, now on NRB tx., f/up CXR, ABG patient developing cytokine storm., post d/w  ID will give 1 dose of toci today.   Family discussion: medical plan of tx. d/w pt. by bedside Disposition: Home__once medically stable

## 2021-04-03 NOTE — CONSULT NOTE ADULT - SUBJECTIVE AND OBJECTIVE BOX
SOSA COLLADO  45y, Female  Allergy: No Known Allergies      CHIEF COMPLAINT:   Shortness of breath (02 Apr 2021 17:56)      LOS  1d    HPI  HPI:  This is a 45 year old F patient with no pertinent past medical Hx presented to the ED for shortness of breath of 1 week duration. Patient was doing well until one week prior to admission when she started to experience exertional shortness of breath on friday 03/26/2021. Patient stated that the dyspnea got progressively worse but is mostly exertional. It was associated with fever (undocumented) and nonproductive cough. Patient was tested for COVID and it was positive two days after the onset of symptoms. she also experienced one episode of vomiting and was having diarrhea for 2 days. Patient denied any chest pain except when she has a spell of cough that resolve completely after the cough stops. Patient denied any lightheadedness, headache or any other symptoms. Patient went to Urgent care on the day of admission and was found to have SpO2 of 83% so she was sent to the ED for further management.     In the ED Vital Signs Last 24 Hrs  T(C): 37.2 (02 Apr 2021 16:18), Max: 38.6 (02 Apr 2021 13:43)  T(F): 99 (02 Apr 2021 16:18), Max: 101.5 (02 Apr 2021 13:43)  HR: 88 (02 Apr 2021 16:18) (88 - 97)  BP: 119/75 (02 Apr 2021 16:18) (112/78 - 119/75)  BP(mean): --  RR: 19 (02 Apr 2021 16:18) (19 - 19)  SpO2: 93% (02 Apr 2021 16:18) (93% - 95%) (02 Apr 2021 17:56)      INFECTIOUS DISEASE HISTORY:  Satting 94 % on 6L  CXR hazy bilateral opacities   C-Reactive Protein, Serum: 73 mg/L (04-02-21 @ 14:20)  D-Dimer Assay, Quantitative: 167 ng/mL DDU (04-02-21 @ 14:20)  Ferritin, Serum: 3872 ng/mL (04-02-21 @ 14:20)  Procalcitonin, Serum: 0.30 ng/mL (04-02-21 @ 14:20)      PMH  PAST MEDICAL & SURGICAL HISTORY:  No pertinent past medical history        FAMILY HISTORY  non-contributory     SOCIAL HISTORY  Social History:  nonsmoker, no drug use, social alcohol use (02 Apr 2021 17:56)        ROS  General: Denies rigors, nightsweats  HEENT: Denies headache, rhinorrhea, sore throat, eye pain  CV: Denies CP, palpitations  PULM: Denies wheezing, hemoptysis  GI: Denies hematemesis, hematochezia, melena  : Denies discharge, hematuria  MSK: Denies arthralgias, myalgias  SKIN: Denies rash, lesions  NEURO: Denies paresthesias, weakness  PSYCH: Denies depression, anxiety    VITALS:  T(F): 100.2, Max: 101.5 (04-02-21 @ 13:43)  HR: 87  BP: 109/70  RR: 18Vital Signs Last 24 Hrs  T(C): 37.9 (03 Apr 2021 05:00), Max: 38.6 (02 Apr 2021 13:43)  T(F): 100.2 (03 Apr 2021 05:00), Max: 101.5 (02 Apr 2021 13:43)  HR: 87 (03 Apr 2021 05:00) (87 - 97)  BP: 109/70 (03 Apr 2021 05:00) (109/70 - 131/83)  BP(mean): 93 (02 Apr 2021 22:46) (93 - 93)  RR: 18 (03 Apr 2021 05:00) (18 - 19)  SpO2: 94% (03 Apr 2021 05:00) (91% - 96%)    TESTS & MEASUREMENTS:                        13.8   3.13  )-----------( 200      ( 02 Apr 2021 14:20 )             43.0     04-02    140  |  102  |  10  ----------------------------<  145<H>  4.2   |  24  |  0.7    Ca    8.5      02 Apr 2021 14:20    TPro  6.7  /  Alb  3.9  /  TBili  <0.2  /  DBili  x   /  AST  208<H>  /  ALT  134<H>  /  AlkPhos  60  04-02    eGFR if Non African American: 105 mL/min/1.73M2 (04-02-21 @ 14:20)  eGFR if African American: 121 mL/min/1.73M2 (04-02-21 @ 14:20)    LIVER FUNCTIONS - ( 02 Apr 2021 14:20 )  Alb: 3.9 g/dL / Pro: 6.7 g/dL / ALK PHOS: 60 U/L / ALT: 134 U/L / AST: 208 U/L / GGT: x                     INFECTIOUS DISEASES TESTING  Procalcitonin, Serum: 0.30 ng/mL (04-02-21 @ 14:20)  COVID-19 PCR: Detected (04-02-21 @ 14:08)      INFLAMMATORY MARKERS  C-Reactive Protein, Serum: 73 mg/L (04-02-21 @ 14:20)      RADIOLOGY & ADDITIONAL TESTS:  I have personally reviewed the last Chest xray  CXR  Xray Chest 1 View- PORTABLE-Urgent:   EXAM:  XR CHEST PORTABLE URGENT 1V            PROCEDURE DATE:  04/02/2021            INTERPRETATION:  Clinical History / Reason for exam: Shortness of breath, cough, fever    Comparison : Chest radiograph 11/30/2019.    Technique/Positioning: Frontal, portable and rotated to left.    Findings:    Support devices: None.    Cardiac/mediastinum/hilum: Unremarkable.    Lung parenchyma/Pleura: New bilateral diffuse lung opacities    Skeleton/soft tissues: Unremarkable.    Impression:    New bilateral diffuse lung opacities, which can be seen with infection including atypical infection such as viral (Covid is not excluded)                  DHARMESH GREER MD; Attending Radiologist  This document has been electronically signed. Apr 2 2021  3:07PM (04-02-21 @ 14:56)      CT      CARDIOLOGY TESTING  12 Lead ECG:   Ventricular Rate 95 BPM    Atrial Rate 95 BPM    P-R Interval 114 ms    QRS Duration 84 ms    Q-T Interval 370 ms    QTC Calculation(Bazett) 464 ms    P Axis -7 degrees    R Axis -8 degrees    T Axis 8 degrees    Diagnosis Line Normal sinus rhythm  Normal ECG    Confirmed by CLARICE HUTTON MD ([id number]) on 4/2/2021 3:29:52 PM (04-02-21 @ 14:17)      MEDICATIONS  chlorhexidine 4% Liquid 1 Topical <User Schedule>  dexAMETHasone     Tablet 6 Oral daily  enoxaparin Injectable 40 SubCutaneous at bedtime  pantoprazole    Tablet 40 Oral before breakfast      Weight      ANTIBIOTICS:      ALLERGIES:  No Known Allergies

## 2021-04-03 NOTE — CONSULT NOTE ADULT - ASSESSMENT
ASSESSMENT  45 year old F patient with no pertinent past medical Hx presented to the ED for shortness of breath of 1 week duration.     IMPRESSION  #COVID19 PNA, Severe (O2 < or = 94% on RA and requiring supplemental O2)    CXR bilateral hazy opacities     Admission WBC 3  C-Reactive Protein, Serum: 73 mg/L (04-02-21 @ 14:20)  D-Dimer Assay, Quantitative: 167 ng/mL DDU (04-02-21 @ 14:20)  Ferritin, Serum: 3872 ng/mL (04-02-21 @ 14:20)  Procalcitonin, Serum: 0.30 ng/mL (04-02-21 @ 14:20)    #Sepsis on admission T>101F, Pulse>90  #Transaminitis     Creatinine, Serum: 0.7 (04-02-21 @ 14:20)      RECOMMENDATIONS  - Remdesivir D1: 200mg x1, Day 2 - Day 5 100mg IV daily. Monitor Cr/LFTs  - Check COVID Ab and if NEGATIVE, can offer and consent for convalescent Plasma: 1 unit over 2h  (Please  patient- will not be able to receive vaccine x 90 days)  - Dexamethasone 6mg x 10 days or until Discharge (whichever is shorter), any taper per Pulm  - ONLY If requiring HFNC or BIPAP and CRP >75 --> Tocilizumab x1 (Wt <65 kg= 400 mg, 65-90 kg =600 mg, >90 kg =800 mg)  - A/C per primary team  - Prone positioning if possible  - Monitor off Abx     If any questions, please call or send a message on [employer] Teams  Spectra [phone]

## 2021-04-04 LAB
ALBUMIN SERPL ELPH-MCNC: 3.6 G/DL — SIGNIFICANT CHANGE UP (ref 3.5–5.2)
ALP SERPL-CCNC: 53 U/L — SIGNIFICANT CHANGE UP (ref 30–115)
ALT FLD-CCNC: 80 U/L — HIGH (ref 0–41)
ANION GAP SERPL CALC-SCNC: 11 MMOL/L — SIGNIFICANT CHANGE UP (ref 7–14)
AST SERPL-CCNC: 83 U/L — HIGH (ref 0–41)
BILIRUB SERPL-MCNC: 0.2 MG/DL — SIGNIFICANT CHANGE UP (ref 0.2–1.2)
BLD GP AB SCN SERPL QL: SIGNIFICANT CHANGE UP
BUN SERPL-MCNC: 22 MG/DL — HIGH (ref 10–20)
CALCIUM SERPL-MCNC: 8.9 MG/DL — SIGNIFICANT CHANGE UP (ref 8.5–10.1)
CHLORIDE SERPL-SCNC: 101 MMOL/L — SIGNIFICANT CHANGE UP (ref 98–110)
CO2 SERPL-SCNC: 28 MMOL/L — SIGNIFICANT CHANGE UP (ref 17–32)
CREAT SERPL-MCNC: 0.7 MG/DL — SIGNIFICANT CHANGE UP (ref 0.7–1.5)
D DIMER BLD IA.RAPID-MCNC: 174 NG/ML DDU — SIGNIFICANT CHANGE UP (ref 0–230)
GLUCOSE SERPL-MCNC: 275 MG/DL — HIGH (ref 70–99)
HCT VFR BLD CALC: 39.4 % — SIGNIFICANT CHANGE UP (ref 37–47)
HGB BLD-MCNC: 12.4 G/DL — SIGNIFICANT CHANGE UP (ref 12–16)
LDH SERPL L TO P-CCNC: 546 — HIGH (ref 50–242)
MCHC RBC-ENTMCNC: 27.7 PG — SIGNIFICANT CHANGE UP (ref 27–31)
MCHC RBC-ENTMCNC: 31.5 G/DL — LOW (ref 32–37)
MCV RBC AUTO: 87.9 FL — SIGNIFICANT CHANGE UP (ref 81–99)
NRBC # BLD: 0 /100 WBCS — SIGNIFICANT CHANGE UP (ref 0–0)
PLATELET # BLD AUTO: 271 K/UL — SIGNIFICANT CHANGE UP (ref 130–400)
POTASSIUM SERPL-MCNC: 4.8 MMOL/L — SIGNIFICANT CHANGE UP (ref 3.5–5)
POTASSIUM SERPL-SCNC: 4.8 MMOL/L — SIGNIFICANT CHANGE UP (ref 3.5–5)
PROT SERPL-MCNC: 6.5 G/DL — SIGNIFICANT CHANGE UP (ref 6–8)
RBC # BLD: 4.48 M/UL — SIGNIFICANT CHANGE UP (ref 4.2–5.4)
RBC # FLD: 13.7 % — SIGNIFICANT CHANGE UP (ref 11.5–14.5)
SODIUM SERPL-SCNC: 140 MMOL/L — SIGNIFICANT CHANGE UP (ref 135–146)
WBC # BLD: 2.4 K/UL — LOW (ref 4.8–10.8)
WBC # FLD AUTO: 2.4 K/UL — LOW (ref 4.8–10.8)

## 2021-04-04 PROCEDURE — 99233 SBSQ HOSP IP/OBS HIGH 50: CPT

## 2021-04-04 RX ADMIN — ENOXAPARIN SODIUM 40 MILLIGRAM(S): 100 INJECTION SUBCUTANEOUS at 17:10

## 2021-04-04 RX ADMIN — REMDESIVIR 500 MILLIGRAM(S): 5 INJECTION INTRAVENOUS at 18:16

## 2021-04-04 RX ADMIN — Medication 10 MILLILITER(S): at 11:37

## 2021-04-04 RX ADMIN — Medication 10 MILLILITER(S): at 23:23

## 2021-04-04 RX ADMIN — Medication 10 MILLILITER(S): at 05:14

## 2021-04-04 RX ADMIN — Medication 10 MILLILITER(S): at 17:10

## 2021-04-04 RX ADMIN — ENOXAPARIN SODIUM 40 MILLIGRAM(S): 100 INJECTION SUBCUTANEOUS at 05:14

## 2021-04-04 RX ADMIN — Medication 6 MILLIGRAM(S): at 05:14

## 2021-04-04 RX ADMIN — Medication 1 TABLET(S): at 11:37

## 2021-04-04 RX ADMIN — CHLORHEXIDINE GLUCONATE 1 APPLICATION(S): 213 SOLUTION TOPICAL at 05:15

## 2021-04-04 NOTE — PROGRESS NOTE ADULT - SUBJECTIVE AND OBJECTIVE BOX
SOSA COLLADO  [facility] T1-3A 031 A ([facility] T1-3A)            Patient was evaluated and examined  by bedside, feeling better today, no dyspnea at rest, on NRB 15 L, sat 92%        REVIEW OF SYSTEMS:  please see pertinent positives mentioned above, all other 12 ROS negative      T(C): , Max: 37.1 (04-03-21 @ 14:25)  HR: 79 (04-04-21 @ 05:03)  BP: 107/71 (04-04-21 @ 05:03)  RR: 18 (04-04-21 @ 05:03)  SpO2: 92% (04-04-21 @ 05:03)  CAPILLARY BLOOD GLUCOSE          PHYSICAL EXAM:  General: NAD, AAOX3, patient is laying comfortably in bed  HEENT: AT, NC, Supple, NO JVD, NO CB  Lungs: decreased breath sounds B/L, no wheezing, no rhonchi  CVS: normal S1, S2, RRR, NO M/G/R  Abdomen: soft, bowel sounds present, non-tender, non-distended  Extremities: no edema, no clubbing, no cyanosis, positive peripheral pulses b/l  Neuro: no acute focal neurological deficits  Skin: no rash, no ecchymosis      LAB  CBC  Date: 04-04-21 @ 07:47  Mean cell Ehzpubhuqs05.7  Mean cell Hemoglobin Conc31.5  Mean cell Volum 87.9  Platelet count-Automate 271  RBC Count 4.48  Red Cell Distrib Width13.7  WBC Count2.40  % Albumin, Urine--  Hematocrit 39.4  Hemoglobin 12.4  CBC  Date: 04-03-21 @ 09:04  Mean cell Xafxbiwtfr55.7  Mean cell Hemoglobin Conc32.1  Mean cell Volum 86.3  Platelet count-Automate 251  RBC Count 4.76  Red Cell Distrib Width13.6  WBC Count2.74  % Albumin, Urine--  Hematocrit 41.1  Hemoglobin 13.2  CBC  Date: 04-02-21 @ 14:20  Mean cell Xvwcypanvd94.9  Mean cell Hemoglobin Conc32.1  Mean cell Volum 87.0  Platelet count-Automate 200  RBC Count 4.94  Red Cell Distrib Width13.6  WBC Count3.13  % Albumin, Urine--  Hematocrit 43.0  Hemoglobin 13.8    BMP  04-04-21 @ 07:47  Blood Gas Arterial-Calcium,Ionized--  Blood Urea Nitrogen, Serum 22 mg/dL<H> [10 - 20]  Carbon Dioxide, Serum28 mmol/L [17 - 32]  Chloride, Vthfj628 mmol/L [98 - 110]  Creatinie, Serum0.7 mg/dL [0.7 - 1.5]  Glucose, Uprte811 mg/dL<H> [70 - 99]  Potassium, Serum4.8 mmol/L [3.5 - 5.0]  Sodium, Serum 140 mmol/L [135 - 146]  [facility]  04-03-21 @ 09:04  Blood Gas Arterial-Calcium,Ionized--  Blood Urea Nitrogen, Serum 17 mg/dL [10 - 20]  Carbon Dioxide, Serum29 mmol/L [17 - 32]  Chloride, Yncye027 mmol/L [98 - 110]  Creatinie, Serum0.8 mg/dL [0.7 - 1.5]  Glucose, Prwby755 mg/dL<H> [70 - 99]  Potassium, Serum4.9 mmol/L [3.5 - 5.0]  Sodium, Serum 141 mmol/L [135 - 146]  [facility]  04-02-21 @ 14:20  Blood Gas Arterial-Calcium,Ionized--  Blood Urea Nitrogen, Serum 10 mg/dL [10 - 20]  Carbon Dioxide, Serum24 mmol/L [17 - 32]  Chloride, Qhsyg033 mmol/L [98 - 110]  Creatinie, Serum0.7 mg/dL [0.7 - 1.5]  Glucose, Tiens164 mg/dL<H> [70 - 99]  Potassium, Serum4.2 mmol/L [3.5 - 5.0]  Sodium, Serum 140 mmol/L [135 - 146]        PT/INR - ( 03 Apr 2021 09:04 )   PT: 12.30 sec;   INR: 1.07 ratio         PTT - ( 03 Apr 2021 09:04 )  PTT:36.1 sec        Medications:  acetaminophen    Suspension .. 650 milliGRAM(s) Oral every 6 hours PRN  chlorhexidine 4% Liquid 1 Application(s) Topical <User Schedule>  dexAMETHasone  Injectable 6 milliGRAM(s) IV Push daily  enoxaparin Injectable 40 milliGRAM(s) SubCutaneous two times a day  guaifenesin/dextromethorphan  Syrup 10 milliLiter(s) Oral every 6 hours  ibuprofen  Tablet. 600 milliGRAM(s) Oral every 6 hours PRN  multivitamin 1 Tablet(s) Oral daily  remdesivir  IVPB   IV Intermittent   remdesivir  IVPB 100 milliGRAM(s) IV Intermittent every 24 hours        Assessment and Plan:  Patient is a 45 year old Female  patient with no pertinent past medical Hx presented to the ED for shortness of breath of 1 week duration. dx. with hypoxia due to covid viral Pneumonia    #Acute hypoxic respiratory failure secondary to COVID  19 viral Pneumonia/ cytokine storm  -Patient was positive for COVID-19 on Sunday 03/28  -4/3- worsening hypoxia, placed on NRB 15 L  -4/4- on NRB 15 L -sat 92%  -as per ID started on IV Remdesivir (day 2/5) , Dexamethazone ( day 3/10), s/p  1 dose of toci on 4/3  -Chest xray showed extensive  Bilateral intersitial infiltrate with no effusion - will repeat next CXR on 4/5  -ddimer-174 , CRP- 73, Ferritin 3872,  procal- 0.3      # Acute transaminitis- due to viral covid infection, decreasing,  continue to trend LFT's.    # Leukopenia - due to covid infection, continue to monitor CBC    #DVT ppx: Lovenox 40 mg SubQ twice daily,  BMI above  30      #GI ppx: not recommended  #Diet: Regular     #Progress Note Handoff: patient is in cytokine storm, remains on NRB 15 L , s/p toci yesterday, if desaturates further consider ICU consult and step down upgrade.  repeat CXR in am .   Family discussion: medical plan of tx. d/w pt. by bedside Disposition: Home__once medically stable

## 2021-04-05 LAB
ALBUMIN SERPL ELPH-MCNC: 3.6 G/DL — SIGNIFICANT CHANGE UP (ref 3.5–5.2)
ALP SERPL-CCNC: 55 U/L — SIGNIFICANT CHANGE UP (ref 30–115)
ALT FLD-CCNC: 68 U/L — HIGH (ref 0–41)
ANION GAP SERPL CALC-SCNC: 12 MMOL/L — SIGNIFICANT CHANGE UP (ref 7–14)
AST SERPL-CCNC: 61 U/L — HIGH (ref 0–41)
BILIRUB SERPL-MCNC: 0.3 MG/DL — SIGNIFICANT CHANGE UP (ref 0.2–1.2)
BUN SERPL-MCNC: 21 MG/DL — HIGH (ref 10–20)
CALCIUM SERPL-MCNC: 9.1 MG/DL — SIGNIFICANT CHANGE UP (ref 8.5–10.1)
CHLORIDE SERPL-SCNC: 103 MMOL/L — SIGNIFICANT CHANGE UP (ref 98–110)
CO2 SERPL-SCNC: 28 MMOL/L — SIGNIFICANT CHANGE UP (ref 17–32)
CREAT SERPL-MCNC: 0.6 MG/DL — LOW (ref 0.7–1.5)
CRP SERPL-MCNC: 28 MG/L — HIGH
D DIMER BLD IA.RAPID-MCNC: 152 NG/ML DDU — SIGNIFICANT CHANGE UP (ref 0–230)
FERRITIN SERPL-MCNC: 2443 NG/ML — HIGH (ref 15–150)
GLUCOSE SERPL-MCNC: 287 MG/DL — HIGH (ref 70–99)
HCT VFR BLD CALC: 40.1 % — SIGNIFICANT CHANGE UP (ref 37–47)
HGB BLD-MCNC: 12.6 G/DL — SIGNIFICANT CHANGE UP (ref 12–16)
LDH SERPL L TO P-CCNC: 588 — HIGH (ref 50–242)
MCHC RBC-ENTMCNC: 27.5 PG — SIGNIFICANT CHANGE UP (ref 27–31)
MCHC RBC-ENTMCNC: 31.4 G/DL — LOW (ref 32–37)
MCV RBC AUTO: 87.6 FL — SIGNIFICANT CHANGE UP (ref 81–99)
NRBC # BLD: 0 /100 WBCS — SIGNIFICANT CHANGE UP (ref 0–0)
PLATELET # BLD AUTO: 316 K/UL — SIGNIFICANT CHANGE UP (ref 130–400)
POTASSIUM SERPL-MCNC: 4.6 MMOL/L — SIGNIFICANT CHANGE UP (ref 3.5–5)
POTASSIUM SERPL-SCNC: 4.6 MMOL/L — SIGNIFICANT CHANGE UP (ref 3.5–5)
PROT SERPL-MCNC: 6.6 G/DL — SIGNIFICANT CHANGE UP (ref 6–8)
RBC # BLD: 4.58 M/UL — SIGNIFICANT CHANGE UP (ref 4.2–5.4)
RBC # FLD: 13.6 % — SIGNIFICANT CHANGE UP (ref 11.5–14.5)
SODIUM SERPL-SCNC: 143 MMOL/L — SIGNIFICANT CHANGE UP (ref 135–146)
WBC # BLD: 2.99 K/UL — LOW (ref 4.8–10.8)
WBC # FLD AUTO: 2.99 K/UL — LOW (ref 4.8–10.8)

## 2021-04-05 PROCEDURE — 99233 SBSQ HOSP IP/OBS HIGH 50: CPT

## 2021-04-05 RX ADMIN — Medication 10 MILLILITER(S): at 18:14

## 2021-04-05 RX ADMIN — ENOXAPARIN SODIUM 40 MILLIGRAM(S): 100 INJECTION SUBCUTANEOUS at 05:20

## 2021-04-05 RX ADMIN — Medication 1 TABLET(S): at 11:51

## 2021-04-05 RX ADMIN — Medication 6 MILLIGRAM(S): at 05:20

## 2021-04-05 RX ADMIN — Medication 10 MILLILITER(S): at 05:20

## 2021-04-05 RX ADMIN — Medication 10 MILLILITER(S): at 11:51

## 2021-04-05 RX ADMIN — REMDESIVIR 500 MILLIGRAM(S): 5 INJECTION INTRAVENOUS at 18:13

## 2021-04-05 RX ADMIN — CHLORHEXIDINE GLUCONATE 1 APPLICATION(S): 213 SOLUTION TOPICAL at 05:19

## 2021-04-05 RX ADMIN — ENOXAPARIN SODIUM 40 MILLIGRAM(S): 100 INJECTION SUBCUTANEOUS at 18:14

## 2021-04-05 NOTE — PROGRESS NOTE ADULT - SUBJECTIVE AND OBJECTIVE BOX
[facility] COLLADO  [facility] T1-3[facility])            Patient was evaluated and examined  by bedside, feeling better today, no dyspnea at rest, no cough, remains on NRB, tolerating diet well        REVIEW OF SYSTEMS:  please see pertinent positives mentioned above, all other 12 ROS negative      T(C): , Max: 36.8 (04-05-21 @ 05:30)  HR: 70 (04-05-21 @ 05:30)  BP: 110/71 (04-05-21 @ 05:30)  RR: 18 (04-05-21 @ 05:30)  SpO2: 92% (04-05-21 @ 05:30)  CAPILLARY BLOOD GLUCOSE          PHYSICAL EXAM:  General: NAD, AAOX3, patient is laying comfortably in bed  HEENT: AT, NC, Supple, NO JVD, NO CB  Lungs: improved air entry  B/L, no wheezing, no rhonchi  CVS: normal S1, S2, RRR, NO M/G/R  Abdomen: soft, bowel sounds present, non-tender, non-distended  Extremities: no edema, no clubbing, no cyanosis, positive peripheral pulses b/l  Neuro: no acute focal neurological deficits  Skin: no rash, no ecchymosis      LAB  CBC  Date: 04-05-21 @ 07:54  Mean cell Jmkvgqrequ61.5  Mean cell Hemoglobin Conc31.4  Mean cell Volum 87.6  Platelet count-Automate 316  RBC Count 4.58  Red Cell Distrib Width13.6  WBC Count2.99  % Albumin, Urine--  Hematocrit 40.1  Hemoglobin 12.6  CBC  Date: 04-04-21 @ 07:47  Mean cell Lckwoxnqyn91.7  Mean cell Hemoglobin Conc31.5  Mean cell Volum 87.9  Platelet count-Automate 271  RBC Count 4.48  Red Cell Distrib Width13.7  WBC Count2.40  % Albumin, Urine--  Hematocrit 39.4  Hemoglobin 12.4  CBC  Date: 04-03-21 @ 09:04  Mean cell Icdxsrgjqy84.7  Mean cell Hemoglobin Conc32.1  Mean cell Volum 86.3  Platelet count-Automate 251  RBC Count 4.76  Red Cell Distrib Width13.6  WBC Count2.74  % Albumin, Urine--  Hematocrit 41.1  Hemoglobin 13.2  CBC  Date: 04-02-21 @ 14:20  Mean cell Tdxbbdakal13.9  Mean cell Hemoglobin Conc32.1  Mean cell Volum 87.0  Platelet count-Automate 200  RBC Count 4.94  Red Cell Distrib Width13.6  WBC Count3.13  % Albumin, Urine--  Hematocrit 43.0  Hemoglobin 13.8    [facility]  04-05-21 @ 07:54  Blood Gas Arterial-Calcium,Ionized--  Blood Urea Nitrogen, Serum 21 mg/dL<H> [10 - 20]  Carbon Dioxide, Serum28 mmol/L [17 - 32]  Chloride, Imjpl836 mmol/L [98 - 110]  Creatinie, Serum0.6 mg/dL<L> [0.7 - 1.5]  Glucose, Ysxvn148 mg/dL<H> [70 - 99]  Potassium, Serum4.6 mmol/L [3.5 - 5.0]  Sodium, Serum 143 mmol/L [135 - 146]  [facility]  04-04-21 @ 07:47  Blood Gas Arterial-Calcium,Ionized--  Blood Urea Nitrogen, Serum 22 mg/dL<H> [10 - 20]  Carbon Dioxide, Serum28 mmol/L [17 - 32]  Chloride, Odxoj957 mmol/L [98 - 110]  Creatinie, Serum0.7 mg/dL [0.7 - 1.5]  Glucose, Azgib054 mg/dL<H> [70 - 99]  Potassium, Serum4.8 mmol/L [3.5 - 5.0]  Sodium, Serum 140 mmol/L [135 - 146]  [facility]  04-03-21 @ 09:04  Blood Gas Arterial-Calcium,Ionized--  Blood Urea Nitrogen, Serum 17 mg/dL [10 - 20]  Carbon Dioxide, Serum29 mmol/L [17 - 32]  Chloride, Elvgm967 mmol/L [98 - 110]  Creatinie, Serum0.8 mg/dL [0.7 - 1.5]  Glucose, Wnmos746 mg/dL<H> [70 - 99]  Potassium, Serum4.9 mmol/L [3.5 - 5.0]  Sodium, Serum 141 mmol/L [135 - 146]  [facility]  04-02-21 @ 14:20  Blood Gas Arterial-Calcium,Ionized--  Blood Urea Nitrogen, Serum 10 mg/dL [10 - 20]  Carbon Dioxide, Serum24 mmol/L [17 - 32]  Chloride, Ectbw068 mmol/L [98 - 110]  Creatinie, Serum0.7 mg/dL [0.7 - 1.5]  Glucose, Edmwp037 mg/dL<H> [70 - 99]  Potassium, Serum4.2 mmol/L [3.5 - 5.0]  Sodium, Serum 140 mmol/L [135 - 146]        Medications:  acetaminophen    Suspension .. 650 milliGRAM(s) Oral every 6 hours PRN  chlorhexidine 4% Liquid 1 Application(s) Topical <User Schedule>  dexAMETHasone  Injectable 6 milliGRAM(s) IV Push daily  enoxaparin Injectable 40 milliGRAM(s) SubCutaneous two times a day  guaifenesin/dextromethorphan  Syrup 10 milliLiter(s) Oral every 6 hours  ibuprofen  Tablet. 600 milliGRAM(s) Oral every 6 hours PRN  multivitamin 1 Tablet(s) Oral daily  remdesivir  IVPB   IV Intermittent   remdesivir  IVPB 100 milliGRAM(s) IV Intermittent every 24 hours        Assessment and Plan:  Patient is a 45 year old Female  patient with no pertinent past medical Hx presented to the ED for shortness of breath of 1 week duration. dx. with hypoxia due to covid viral Pneumonia    #Acute hypoxic respiratory failure secondary to COVID  19 viral Pneumonia/ cytokine storm-  -Patient was positive for COVID-19 on Sunday 03/28  -4/3- worsening hypoxia, placed on NRB 15 L  -4/4- on NRB 15 L -sat 92%  -4/5- on NRB 15 L - sat 92%- clinically better , no dyspnea at rest.  -as per ID started on IV Remdesivir (day 3/5) , Dexamethazone ( day 4/10), s/p  1 dose of toci on 4/3  -Chest xray showed extensive  Bilateral intersitial infiltrate  -ddimer-152 , CRP- 73, Ferritin 3872,  procal- 0.3,       # Acute transaminitis- due to viral covid infection, decreasing,  continue to trend LFT's.    # Leukopenia - due to covid infection, continue to monitor CBC    #DVT ppx: Lovenox 40 mg SubQ twice daily,  BMI above  30      #GI ppx: not recommended  #Diet: Regular     #Progress Note Handoff: Patient is responding well to medical tx, with resolution of dyspnea at rest, f/up today's CXR and inflammatory markers, close pulse ox. monitoring.   Family discussion: medical plan of care d/w pt. by bedside Disposition: Home___once medically stable

## 2021-04-05 NOTE — PROGRESS NOTE ADULT - SUBJECTIVE AND OBJECTIVE BOX
Yes     SOSA COLLADO  45y, Female  Allergy: No Known Allergies      LOS  3d    CHIEF COMPLAINT: Shortness of breath (04 Apr 2021 11:51)      INTERVAL EVENTS/HPI  - NRB  - T(F): , Max: 98.2 (04-05-21 @ 05:30)  - WBC Count: 2.99 (04-05-21 @ 07:54)  WBC Count: 2.40 (04-04-21 @ 07:47)  - Creatinine, Serum: 0.6 (04-05-21 @ 07:54)  Creatinine, Serum: 0.7 (04-04-21 @ 07:47)       COVID-19 Raymundo Domain AB Interp: Negative (04-03-21 @ 09:04)      ROS:  9-point ROS performed and negative except as per above    VITALS:  T(F): 98.2, Max: 98.2 (04-05-21 @ 05:30)  HR: 70  BP: 110/71  RR: 18Vital Signs Last 24 Hrs  T(C): 36.8 (05 Apr 2021 05:30), Max: 36.8 (05 Apr 2021 05:30)  T(F): 98.2 (05 Apr 2021 05:30), Max: 98.2 (05 Apr 2021 05:30)  HR: 70 (05 Apr 2021 05:30) (70 - 75)  BP: 110/71 (05 Apr 2021 05:30) (103/59 - 110/71)  BP(mean): --  RR: 18 (05 Apr 2021 05:30) (18 - 18)  SpO2: 92% (05 Apr 2021 05:30) (91% - 96%)    FH: Non-contributory  Social Hx: Non-contributory    TESTS & MEASUREMENTS:                        12.6   2.99  )-----------( 316      ( 05 Apr 2021 07:54 )             40.1     04-05    143  |  103  |  21<H>  ----------------------------<  287<H>  4.6   |  28  |  0.6<L>    Ca    9.1      05 Apr 2021 07:54    TPro  6.6  /  Alb  3.6  /  TBili  0.3  /  DBili  x   /  AST  61<H>  /  ALT  68<H>  /  AlkPhos  55  04-05    eGFR if Non African American: 110 mL/min/1.73M2 (04-05-21 @ 07:54)  eGFR if African American: 128 mL/min/1.73M2 (04-05-21 @ 07:54)    LIVER FUNCTIONS - ( 05 Apr 2021 07:54 )  Alb: 3.6 g/dL / Pro: 6.6 g/dL / ALK PHOS: 55 U/L / ALT: 68 U/L / AST: 61 U/L / GGT: x                     INFECTIOUS DISEASES TESTING  Procalcitonin, Serum: 0.30 (04-02-21 @ 14:20)  COVID-19 PCR: Detected (04-02-21 @ 14:08)      INFLAMMATORY MARKERS  C-Reactive Protein, Serum: 73 mg/L (04-02-21 @ 14:20)      RADIOLOGY & ADDITIONAL TESTS:  I have personally reviewed the last available Chest xray  CXR  Xray Chest 1 View- PORTABLE-Urgent:   EXAM:  XR CHEST PORTABLE URGENT 1V            PROCEDURE DATE:  04/03/2021            INTERPRETATION:  Clinical History / Reason for exam: Hypoxia    Comparison : Chest radiograph April 2, 2021.    Technique/Positioning: Frontal.    Findings:    Support devices: None.    Cardiac/mediastinum/hilum: Unremarkable.    Lung parenchyma/Pleura: Bilateral opacities.    Skeleton/soft tissues: Elevation of the hemidiaphragm. Gaseous distention of the stomach.    Impression:    Bilateral opacities, stable.              MODESTA VELEZ MD; Attending Radiologist  This document has been electronically signed. Apr  3 2021 12:43PM (04-03-21 @ 12:06)      CT      CARDIOLOGY TESTING  12 Lead ECG:   Ventricular Rate 95 BPM    Atrial Rate 95 BPM    P-R Interval 114 ms    QRS Duration 84 ms    Q-T Interval 370 ms    QTC Calculation(Bazett) 464 ms    P Axis -7 degrees    R Axis -8 degrees    T Axis 8 degrees    Diagnosis Line Normal sinus rhythm  Normal ECG    Confirmed by CLARICE HUTTON MD ([id number]) on 4/2/2021 3:29:52 PM (04-02-21 @ 14:17)      MEDICATIONS  chlorhexidine 4% Liquid 1 Topical <User Schedule>  dexAMETHasone  Injectable 6 IV Push daily  enoxaparin Injectable 40 SubCutaneous two times a day  guaifenesin/dextromethorphan  Syrup 10 Oral every 6 hours  multivitamin 1 Oral daily  remdesivir  IVPB  IV Intermittent   remdesivir  IVPB 100 IV Intermittent every 24 hours      WEIGHT  Weight (kg): 102.6 (04-03-21 @ 11:21)  Creatinine, Serum: 0.6 mg/dL (04-05-21 @ 07:54)      ANTIBIOTICS:  remdesivir  IVPB   IV Intermittent   remdesivir  IVPB 100 milliGRAM(s) IV Intermittent every 24 hours      All available historical records have been reviewed

## 2021-04-05 NOTE — PROGRESS NOTE ADULT - SUBJECTIVE AND OBJECTIVE BOX
SOSA COLLADO 45y Female  MRN#: [id number]   CODE STATUS:________    SUBJECTIVE  Patient is a 45y old Female who presents with a chief complaint of Shortness of breath (03 Apr 2021 11:12)  Currently admitted to medicine with the primary diagnosis of COVID-19     Today is hospital day 3d, and this morning she is resting comfortably and reports no overnight events.       OBJECTIVE  PAST MEDICAL & SURGICAL HISTORY  No pertinent past medical history      ALLERGIES:  No Known Allergies    MEDICATIONS:  STANDING MEDICATIONS  chlorhexidine 4% Liquid 1 Application(s) Topical <User Schedule>  dexAMETHasone  Injectable 6 milliGRAM(s) IV Push daily  enoxaparin Injectable 40 milliGRAM(s) SubCutaneous two times a day  guaifenesin/dextromethorphan  Syrup 10 milliLiter(s) Oral every 6 hours  multivitamin 1 Tablet(s) Oral daily  remdesivir  IVPB   IV Intermittent   remdesivir  IVPB 100 milliGRAM(s) IV Intermittent every 24 hours    PRN MEDICATIONS  acetaminophen    Suspension .. 650 milliGRAM(s) Oral every 6 hours PRN  ibuprofen  Tablet. 600 milliGRAM(s) Oral every 6 hours PRN      VITAL SIGNS: Last 24 Hours  T(C): 36.3 (05 Apr 2021 13:09), Max: 36.8 (05 Apr 2021 05:30)  T(F): 97.4 (05 Apr 2021 13:09), Max: 98.2 (05 Apr 2021 05:30)  HR: 79 (05 Apr 2021 13:09) (70 - 79)  BP: 115/78 (05 Apr 2021 13:09) (103/59 - 115/78)  RR: 18 (05 Apr 2021 13:09) (18 - 18)  SpO2: 92% (05 Apr 2021 13:09) (91% - 96%)    LABS:             12.6   2.99  )-----------( 316      ( 05 Apr 2021 07:54 )             40.1     143  |  103  |  21<H>  ----------------------------<  287<H>  4.6   |  28  |  0.6<L>    Ca    9.1      05 Apr 2021 07:54    TPro  6.6  /  Alb  3.6  /  TBili  0.3  /  DBili  x   /  AST  61<H>  /  ALT  68<H>  /  AlkPhos  55  04-05                                 RADIOLOGY:  Xray Chest 1 View- PORTABLE-Urgent (04.02.21 @ 14:56)  Impression:  New bilateral diffuse lung opacities, which can be seen with infection including atypical infection such as viral (Covid is not excluded)    Xray Chest 1 View- PORTABLE-Urgent (Xray Chest 1 View- PORTABLE-Urgent .) (04.03.21 @ 12:06)  Impression:  Bilateral opacities, stable.  `  PHYSICAL EXAM:  GENERAL: NAD, AAOx3  HEENT:  Atraumatic, Normocephalic.   PULMONARY: crackles heard b/l; No wheeze  CARDIOVASCULAR: Regular rate and rhythm; No murmurs  GASTROINTESTINAL: Soft, Nontender, Nondistended; Bowel sounds present  MUSCULOSKELETAL:   No clubbing, cyanosis, or edema  NEUROLOGY: non-focal  SKIN: No rashes or lesions      ASSESSMENT & PLAN  This is a 45 year old F patient with no pertinent past medical Hx presented to the ED for shortness of breath of 1 week duration.    #Acute hypoxic respiratory failure secondary to COVID PNA  -Patient was positive for COVID-19 on Sunday 03/28  -Patient SpO2 was 83% at rest, 95% on 6L NC  -Currently on NRB 15L sating 92%, titrate O2 prn  -Chest xray showed Bilateral intersitial infiltrate with no effusion   -c/w Decadron 6 mg daily for 10 days---day 3  - c/w remdesivir day 2  -ddimer-167, CRP- 73, Ferritin 3872,  procal- 0.3  -Titrate O2 as tolerated   -ID following  -COVID ab negative, can do plasma  -f/u inflammatory markers    #DVT ppx: Lovenox 40 mg SubQ daily   #GI ppx: Protonix   #Diet: Regular   #Acute  SOSA COLLADO 45y Female  MRN#: [id number]   CODE STATUS:________    SUBJECTIVE  Patient is a 45y old Female who presents with a chief complaint of Shortness of breath (03 Apr 2021 11:12)  Currently admitted to medicine with the primary diagnosis of COVID-19     Today is hospital day 3d, and this morning she is resting comfortably and reports no overnight events.       OBJECTIVE  PAST MEDICAL & SURGICAL HISTORY  No pertinent past medical history      ALLERGIES:  No Known Allergies    MEDICATIONS:  STANDING MEDICATIONS  chlorhexidine 4% Liquid 1 Application(s) Topical <User Schedule>  dexAMETHasone  Injectable 6 milliGRAM(s) IV Push daily  enoxaparin Injectable 40 milliGRAM(s) SubCutaneous two times a day  guaifenesin/dextromethorphan  Syrup 10 milliLiter(s) Oral every 6 hours  multivitamin 1 Tablet(s) Oral daily  remdesivir  IVPB   IV Intermittent   remdesivir  IVPB 100 milliGRAM(s) IV Intermittent every 24 hours    PRN MEDICATIONS  acetaminophen    Suspension .. 650 milliGRAM(s) Oral every 6 hours PRN  ibuprofen  Tablet. 600 milliGRAM(s) Oral every 6 hours PRN      VITAL SIGNS: Last 24 Hours  T(C): 36.3 (05 Apr 2021 13:09), Max: 36.8 (05 Apr 2021 05:30)  T(F): 97.4 (05 Apr 2021 13:09), Max: 98.2 (05 Apr 2021 05:30)  HR: 79 (05 Apr 2021 13:09) (70 - 79)  BP: 115/78 (05 Apr 2021 13:09) (103/59 - 115/78)  RR: 18 (05 Apr 2021 13:09) (18 - 18)  SpO2: 92% (05 Apr 2021 13:09) (91% - 96%)    LABS:             12.6   2.99  )-----------( 316      ( 05 Apr 2021 07:54 )             40.1     143  |  103  |  21<H>  ----------------------------<  287<H>  4.6   |  28  |  0.6<L>    Ca    9.1      05 Apr 2021 07:54    TPro  6.6  /  Alb  3.6  /  TBili  0.3  /  DBili  x   /  AST  61<H>  /  ALT  68<H>  /  AlkPhos  55  04-05                                 RADIOLOGY:  Xray Chest 1 View- PORTABLE-Urgent (04.02.21 @ 14:56)  Impression:  New bilateral diffuse lung opacities, which can be seen with infection including atypical infection such as viral (Covid is not excluded)    Xray Chest 1 View- PORTABLE-Urgent (Xray Chest 1 View- PORTABLE-Urgent .) (04.03.21 @ 12:06)  Impression:  Bilateral opacities, stable.  `  PHYSICAL EXAM:  GENERAL: NAD, AAOx3  HEENT:  Atraumatic, Normocephalic.   PULMONARY: crackles heard b/l; No wheeze  CARDIOVASCULAR: Regular rate and rhythm; No murmurs  GASTROINTESTINAL: Soft, Nontender, Nondistended; Bowel sounds present  MUSCULOSKELETAL:   No clubbing, cyanosis, or edema  NEUROLOGY: non-focal  SKIN: No rashes or lesions  `

## 2021-04-05 NOTE — PROGRESS NOTE ADULT - ASSESSMENT
Patient is a 45 year old Female  patient with no pertinent past medical Hx presented to the ED for shortness of breath of 1 week duration. dx. with hypoxia due to covid viral Pneumonia    #Acute hypoxic respiratory failure 2/2 COVID PNA + cytokine storm +transaminitis  - 1st positive on 03/28  - CXR w diffuse b/l opacities; +leukopenia  - crp 73>28, procal .3, ferritin 3872>2443, ldh 546>588, ddimer 167>>152  - patient had increased oxygen requirements - currently on 15L NRB @93%   - ID recs appreciated   - cont RDV for 5 days (day 1 4/3), dex for 10 days (day 1 4/3)  - s/p toci 4/3  - Repeat inflamm markers q48  - repeat CXR in AM  - wean O2 as tolerated    DVT ppx: Lovenox 40 mg SubQ bid (BMI >30)   GI ppx: not recommended  Diet: Regular  Activity: IAT  FULL CODE    Pending: clinical improvement - try to get patient to neg pressure room incase needs HFNC   Patient is a 45 year old Female  patient with no pertinent past medical Hx presented to the ED for shortness of breath of 1 week duration. dx. with hypoxia due to covid viral Pneumonia    #Acute hypoxic respiratory failure 2/2 COVID PNA + cytokine storm +transaminitis  - 1st positive on 03/28  - CXR w diffuse b/l opacities; +leukopenia  - crp 73>28, procal .3, ferritin 3872>2443, ldh 546>588, ddimer 167>>152  - patient had increased oxygen requirements - currently on 15L NRB @93%   - ID recs appreciated   - cont RDV for 5 days (day 1 4/3), dex for 10 days (day 1 4/3)  - s/p toci 4/3, plasma 4/4  - Repeat inflamm markers q48  - repeat CXR in AM  - wean O2 as tolerated    DVT ppx: Lovenox 40 mg SubQ bid (BMI >30)   GI ppx: not recommended  Diet: Regular  Activity: IAT  FULL CODE    Pending: clinical improvement - try to get patient to neg pressure room incase needs HFNC

## 2021-04-05 NOTE — PROGRESS NOTE ADULT - ASSESSMENT
ASSESSMENT  45 year old F patient with no pertinent past medical Hx presented to the ED for shortness of breath of 1 week duration.     IMPRESSION  #COVID19 PNA, Severe (O2 < or = 94% on RA and requiring supplemental O2)    CXR bilateral hazy opacities     Admission WBC 3    AB (-)  C-Reactive Protein, Serum: 73 mg/L (04-02-21 @ 14:20)  D-Dimer Assay, Quantitative: 167 ng/mL DDU (04-02-21 @ 14:20)  Ferritin, Serum: 3872 ng/mL (04-02-21 @ 14:20)  Procalcitonin, Serum: 0.30 ng/mL (04-02-21 @ 14:20)    #Sepsis on admission T>101F, Pulse>90  #Transaminitis     Creatinine, Serum: 0.7 (04-02-21 @ 14:20)      RECOMMENDATIONS  - Remdesivir D1: 200mg x1, Day 2 - Day 5 100mg IV daily. Monitor Cr/LFTs  - s/p Tocilizumab 4/3   - s/p 4/4 convalescent Plasma: 1 unit over 2h  (Please  patient- will not be able to receive vaccine x 90 days)  - Dexamethasone 6mg x 10 days or until Discharge (whichever is shorter), any taper per Pulm  - A/C per primary team  - Prone positioning if possible  - Monitor off Abx     If any questions, please call or send a message on [employer] Teams  Spectra [phone]

## 2021-04-06 LAB
ALBUMIN SERPL ELPH-MCNC: 3.5 G/DL — SIGNIFICANT CHANGE UP (ref 3.5–5.2)
ALP SERPL-CCNC: 52 U/L — SIGNIFICANT CHANGE UP (ref 30–115)
ALT FLD-CCNC: 85 U/L — HIGH (ref 0–41)
ANION GAP SERPL CALC-SCNC: 12 MMOL/L — SIGNIFICANT CHANGE UP (ref 7–14)
AST SERPL-CCNC: 80 U/L — HIGH (ref 0–41)
BASOPHILS # BLD AUTO: 0.01 K/UL — SIGNIFICANT CHANGE UP (ref 0–0.2)
BASOPHILS NFR BLD AUTO: 0.3 % — SIGNIFICANT CHANGE UP (ref 0–1)
BILIRUB SERPL-MCNC: 0.6 MG/DL — SIGNIFICANT CHANGE UP (ref 0.2–1.2)
BUN SERPL-MCNC: 20 MG/DL — SIGNIFICANT CHANGE UP (ref 10–20)
CALCIUM SERPL-MCNC: 8.4 MG/DL — LOW (ref 8.5–10.1)
CHLORIDE SERPL-SCNC: 99 MMOL/L — SIGNIFICANT CHANGE UP (ref 98–110)
CO2 SERPL-SCNC: 26 MMOL/L — SIGNIFICANT CHANGE UP (ref 17–32)
CREAT SERPL-MCNC: 0.7 MG/DL — SIGNIFICANT CHANGE UP (ref 0.7–1.5)
CRP SERPL-MCNC: 13 MG/L — HIGH
EOSINOPHIL # BLD AUTO: 0 K/UL — SIGNIFICANT CHANGE UP (ref 0–0.7)
EOSINOPHIL NFR BLD AUTO: 0 % — SIGNIFICANT CHANGE UP (ref 0–8)
FERRITIN SERPL-MCNC: 1902 NG/ML — HIGH (ref 15–150)
GLUCOSE BLDC GLUCOMTR-MCNC: 162 MG/DL — HIGH (ref 70–99)
GLUCOSE BLDC GLUCOMTR-MCNC: 163 MG/DL — HIGH (ref 70–99)
GLUCOSE BLDC GLUCOMTR-MCNC: 279 MG/DL — HIGH (ref 70–99)
GLUCOSE SERPL-MCNC: 354 MG/DL — HIGH (ref 70–99)
HCT VFR BLD CALC: 40.2 % — SIGNIFICANT CHANGE UP (ref 37–47)
HGB BLD-MCNC: 12.8 G/DL — SIGNIFICANT CHANGE UP (ref 12–16)
IMM GRANULOCYTES NFR BLD AUTO: 1 % — HIGH (ref 0.1–0.3)
LYMPHOCYTES # BLD AUTO: 0.52 K/UL — LOW (ref 1.2–3.4)
LYMPHOCYTES # BLD AUTO: 17.2 % — LOW (ref 20.5–51.1)
MAGNESIUM SERPL-MCNC: 2.4 MG/DL — SIGNIFICANT CHANGE UP (ref 1.8–2.4)
MCHC RBC-ENTMCNC: 27.9 PG — SIGNIFICANT CHANGE UP (ref 27–31)
MCHC RBC-ENTMCNC: 31.8 G/DL — LOW (ref 32–37)
MCV RBC AUTO: 87.6 FL — SIGNIFICANT CHANGE UP (ref 81–99)
MONOCYTES # BLD AUTO: 0.28 K/UL — SIGNIFICANT CHANGE UP (ref 0.1–0.6)
MONOCYTES NFR BLD AUTO: 9.3 % — SIGNIFICANT CHANGE UP (ref 1.7–9.3)
NEUTROPHILS # BLD AUTO: 2.18 K/UL — SIGNIFICANT CHANGE UP (ref 1.4–6.5)
NEUTROPHILS NFR BLD AUTO: 72.2 % — SIGNIFICANT CHANGE UP (ref 42.2–75.2)
NRBC # BLD: 0 /100 WBCS — SIGNIFICANT CHANGE UP (ref 0–0)
PLATELET # BLD AUTO: 321 K/UL — SIGNIFICANT CHANGE UP (ref 130–400)
POTASSIUM SERPL-MCNC: 4.7 MMOL/L — SIGNIFICANT CHANGE UP (ref 3.5–5)
POTASSIUM SERPL-SCNC: 4.7 MMOL/L — SIGNIFICANT CHANGE UP (ref 3.5–5)
PROT SERPL-MCNC: 6.4 G/DL — SIGNIFICANT CHANGE UP (ref 6–8)
RBC # BLD: 4.59 M/UL — SIGNIFICANT CHANGE UP (ref 4.2–5.4)
RBC # FLD: 13.4 % — SIGNIFICANT CHANGE UP (ref 11.5–14.5)
SODIUM SERPL-SCNC: 137 MMOL/L — SIGNIFICANT CHANGE UP (ref 135–146)
WBC # BLD: 3.02 K/UL — LOW (ref 4.8–10.8)
WBC # FLD AUTO: 3.02 K/UL — LOW (ref 4.8–10.8)

## 2021-04-06 PROCEDURE — 71045 X-RAY EXAM CHEST 1 VIEW: CPT | Mod: 26

## 2021-04-06 PROCEDURE — 99233 SBSQ HOSP IP/OBS HIGH 50: CPT

## 2021-04-06 RX ORDER — INSULIN LISPRO 100/ML
VIAL (ML) SUBCUTANEOUS
Refills: 0 | Status: DISCONTINUED | OUTPATIENT
Start: 2021-04-06 | End: 2021-04-10

## 2021-04-06 RX ORDER — SODIUM CHLORIDE 9 MG/ML
1000 INJECTION, SOLUTION INTRAVENOUS
Refills: 0 | Status: DISCONTINUED | OUTPATIENT
Start: 2021-04-06 | End: 2021-04-10

## 2021-04-06 RX ORDER — GLUCAGON INJECTION, SOLUTION 0.5 MG/.1ML
1 INJECTION, SOLUTION SUBCUTANEOUS ONCE
Refills: 0 | Status: DISCONTINUED | OUTPATIENT
Start: 2021-04-06 | End: 2021-04-10

## 2021-04-06 RX ORDER — PANTOPRAZOLE SODIUM 20 MG/1
40 TABLET, DELAYED RELEASE ORAL
Refills: 0 | Status: DISCONTINUED | OUTPATIENT
Start: 2021-04-06 | End: 2021-04-10

## 2021-04-06 RX ADMIN — REMDESIVIR 500 MILLIGRAM(S): 5 INJECTION INTRAVENOUS at 17:22

## 2021-04-06 RX ADMIN — Medication 1: at 17:19

## 2021-04-06 RX ADMIN — CHLORHEXIDINE GLUCONATE 1 APPLICATION(S): 213 SOLUTION TOPICAL at 05:12

## 2021-04-06 RX ADMIN — Medication 1 TABLET(S): at 12:03

## 2021-04-06 RX ADMIN — Medication 6 MILLIGRAM(S): at 05:13

## 2021-04-06 RX ADMIN — Medication 3: at 12:03

## 2021-04-06 RX ADMIN — Medication 10 MILLILITER(S): at 02:05

## 2021-04-06 RX ADMIN — Medication 10 MILLILITER(S): at 17:19

## 2021-04-06 RX ADMIN — ENOXAPARIN SODIUM 40 MILLIGRAM(S): 100 INJECTION SUBCUTANEOUS at 05:13

## 2021-04-06 RX ADMIN — Medication 10 MILLILITER(S): at 12:03

## 2021-04-06 RX ADMIN — Medication 10 MILLILITER(S): at 05:13

## 2021-04-06 RX ADMIN — ENOXAPARIN SODIUM 40 MILLIGRAM(S): 100 INJECTION SUBCUTANEOUS at 17:19

## 2021-04-06 NOTE — PROGRESS NOTE ADULT - ASSESSMENT
Patient is a 45 year old Female  patient with no pertinent past medical Hx presented to the ED for shortness of breath of 1 week duration. dx. with hypoxia due to covid viral Pneumonia    #Acute hypoxic respiratory failure 2/2 COVID PNA + cytokine storm +transaminitis  - patient had increased oxygen requirements yesterday - currently on 15L NRB @93% . on ambulation went down to 87%. switch to HFNC for oral intake   - 1st positive on 03/28  - CXR 4/03 - w diffuse b/l opacities; +leukopenia.   - repeat CXR 4/06- improving  - crp 4/05 73>28 -13   - procal 4/02 -> .3  - ferritin 4/05 3872>2443,   - ddimer 4/05 167>>152  - s/p toci 4/3, plasma 4/4  - cont RDV for 5 days (day 1 4/3-4/7),  - continue dex for 10 days (day 1 4/3 4/9)  - Repeat inflamm markers q48  - wean O2 as tolerated    DVT ppx: Lovenox 40 mg SubQ bid (BMI >30)   GI ppx: not recommended  Diet: Regular  Activity: IAT  FULL CODE    Pending: clinical improvement

## 2021-04-06 NOTE — PROGRESS NOTE ADULT - SUBJECTIVE AND OBJECTIVE BOX
SOSA COLLADO 45y Female  MRN#: [id number]     SUBJECTIVE  Patient is a 45y old Female who presents with a chief complaint of Shortness of breath (03 Apr 2021 11:12)  Currently admitted to medicine with the primary diagnosis of COVID-19     Today is hospital day 3d, and this morning she is resting comfortably and reports no overnight events.       OBJECTIVE  PAST MEDICAL & SURGICAL HISTORY  No pertinent past medical history      ALLERGIES:  No Known Allergies    MEDICATIONS:  MEDICATIONS  (STANDING):  chlorhexidine 4% Liquid 1 Application(s) Topical <User Schedule>  dexAMETHasone  Injectable 6 milliGRAM(s) IV Push daily  enoxaparin Injectable 40 milliGRAM(s) SubCutaneous two times a day  guaifenesin/dextromethorphan  Syrup 10 milliLiter(s) Oral every 6 hours  multivitamin 1 Tablet(s) Oral daily  pantoprazole    Tablet 40 milliGRAM(s) Oral before breakfast  remdesivir  IVPB   IV Intermittent   remdesivir  IVPB 100 milliGRAM(s) IV Intermittent every 24 hours    MEDICATIONS  (PRN):  acetaminophen    Suspension .. 650 milliGRAM(s) Oral every 6 hours PRN Temp greater or equal to 38C (100.4F)  ibuprofen  Tablet. 600 milliGRAM(s) Oral every 6 hours PRN Temp greater or equal to 38C (100.4F), Mild Pain (1 - 3)        VITAL SIGNS: Last 24 Hours    ICU Vital Signs Last 24 Hrs  T(C): 35.3 (06 Apr 2021 06:03), Max: 37.1 (05 Apr 2021 21:00)  T(F): 95.5 (06 Apr 2021 06:03), Max: 98.8 (05 Apr 2021 21:00)  HR: 69 (06 Apr 2021 06:03) (69 - 79)  BP: 111/65 (06 Apr 2021 06:03) (111/65 - 135/86)  RR: 18 (06 Apr 2021 06:03) (18 - 18)  SpO2: 93% (06 Apr 2021 06:03) (87% - 94%)      LABS:                          12.6   2.99  )-----------( 316      ( 05 Apr 2021 07:54 )             40.1 04-05    143  |  103  |  21  ----------------------------<  287  4.6   |  28  |  0.6    Ca    9.1      05 Apr 2021 07:54    TPro  6.6  /  Alb  3.6  /  TBili  0.3  /  DBili  x   /  AST  61  /  ALT  68  /  AlkPhos  55  04-05    Procalcitonin, Serum: 0.30 (04-02)    C-Reactive Protein, Serum: 13 (04-05)  C-Reactive Protein, Serum: 28 (04-04)  C-Reactive Protein, Serum: 73 (04-02)    Ferritin, Serum: 1902 (04-05)  Ferritin, Serum: 2443 (04-04)  Ferritin, Serum: 3872 (04-02)    D-Dimer Assay, Quantitative: 152 (04-05)  D-Dimer Assay, Quantitative: 174 (04-04)  D-Dimer Assay, Quantitative: 167 (04-02)                                   RADIOLOGY:    Xray Chest 1 View- PORTABLE-Urgent (04.02.21 @ 14:56)  Impression:  New bilateral diffuse lung opacities, which can be seen with infection including atypical infection such as viral (Covid is not excluded)    Xray Chest 1 View- PORTABLE-Urgent (Xray Chest 1 View- PORTABLE-Urgent .) (04.03.21 @ 12:06)  Impression:  Bilateral opacities, stable.  `  PHYSICAL EXAM:  GENERAL: NAD, AAOx3  HEENT:  Atraumatic, Normocephalic.   PULMONARY: crackles heard b/l; No wheeze  CARDIOVASCULAR: Regular rate and rhythm; No murmurs  GASTROINTESTINAL: Soft, Nontender, Nondistended; Bowel sounds present  MUSCULOSKELETAL:   No clubbing, cyanosis, or edema  NEUROLOGY: non-focal  SKIN: No rashes or lesions  `

## 2021-04-07 LAB
ALBUMIN SERPL ELPH-MCNC: 3.6 G/DL — SIGNIFICANT CHANGE UP (ref 3.5–5.2)
ALP SERPL-CCNC: 54 U/L — SIGNIFICANT CHANGE UP (ref 30–115)
ALT FLD-CCNC: 98 U/L — HIGH (ref 0–41)
ANION GAP SERPL CALC-SCNC: 8 MMOL/L — SIGNIFICANT CHANGE UP (ref 7–14)
AST SERPL-CCNC: 88 U/L — HIGH (ref 0–41)
BASOPHILS # BLD AUTO: 0.01 K/UL — SIGNIFICANT CHANGE UP (ref 0–0.2)
BASOPHILS NFR BLD AUTO: 0.2 % — SIGNIFICANT CHANGE UP (ref 0–1)
BILIRUB SERPL-MCNC: 0.7 MG/DL — SIGNIFICANT CHANGE UP (ref 0.2–1.2)
BUN SERPL-MCNC: 20 MG/DL — SIGNIFICANT CHANGE UP (ref 10–20)
CALCIUM SERPL-MCNC: 8.7 MG/DL — SIGNIFICANT CHANGE UP (ref 8.5–10.1)
CHLORIDE SERPL-SCNC: 96 MMOL/L — LOW (ref 98–110)
CO2 SERPL-SCNC: 29 MMOL/L — SIGNIFICANT CHANGE UP (ref 17–32)
CREAT SERPL-MCNC: 0.7 MG/DL — SIGNIFICANT CHANGE UP (ref 0.7–1.5)
EOSINOPHIL # BLD AUTO: 0.01 K/UL — SIGNIFICANT CHANGE UP (ref 0–0.7)
EOSINOPHIL NFR BLD AUTO: 0.2 % — SIGNIFICANT CHANGE UP (ref 0–8)
GLUCOSE BLDC GLUCOMTR-MCNC: 156 MG/DL — HIGH (ref 70–99)
GLUCOSE BLDC GLUCOMTR-MCNC: 158 MG/DL — HIGH (ref 70–99)
GLUCOSE BLDC GLUCOMTR-MCNC: 225 MG/DL — HIGH (ref 70–99)
GLUCOSE BLDC GLUCOMTR-MCNC: 322 MG/DL — HIGH (ref 70–99)
GLUCOSE SERPL-MCNC: 289 MG/DL — HIGH (ref 70–99)
HCT VFR BLD CALC: 42.2 % — SIGNIFICANT CHANGE UP (ref 37–47)
HGB BLD-MCNC: 13.4 G/DL — SIGNIFICANT CHANGE UP (ref 12–16)
IMM GRANULOCYTES NFR BLD AUTO: 1.3 % — HIGH (ref 0.1–0.3)
LYMPHOCYTES # BLD AUTO: 0.67 K/UL — LOW (ref 1.2–3.4)
LYMPHOCYTES # BLD AUTO: 15 % — LOW (ref 20.5–51.1)
MCHC RBC-ENTMCNC: 27.9 PG — SIGNIFICANT CHANGE UP (ref 27–31)
MCHC RBC-ENTMCNC: 31.8 G/DL — LOW (ref 32–37)
MCV RBC AUTO: 87.9 FL — SIGNIFICANT CHANGE UP (ref 81–99)
MONOCYTES # BLD AUTO: 0.24 K/UL — SIGNIFICANT CHANGE UP (ref 0.1–0.6)
MONOCYTES NFR BLD AUTO: 5.4 % — SIGNIFICANT CHANGE UP (ref 1.7–9.3)
NEUTROPHILS # BLD AUTO: 3.47 K/UL — SIGNIFICANT CHANGE UP (ref 1.4–6.5)
NEUTROPHILS NFR BLD AUTO: 77.9 % — HIGH (ref 42.2–75.2)
NRBC # BLD: 0 /100 WBCS — SIGNIFICANT CHANGE UP (ref 0–0)
PLATELET # BLD AUTO: 330 K/UL — SIGNIFICANT CHANGE UP (ref 130–400)
POTASSIUM SERPL-MCNC: 5.2 MMOL/L — HIGH (ref 3.5–5)
POTASSIUM SERPL-SCNC: 5.2 MMOL/L — HIGH (ref 3.5–5)
PROT SERPL-MCNC: 6.2 G/DL — SIGNIFICANT CHANGE UP (ref 6–8)
RBC # BLD: 4.8 M/UL — SIGNIFICANT CHANGE UP (ref 4.2–5.4)
RBC # FLD: 13.2 % — SIGNIFICANT CHANGE UP (ref 11.5–14.5)
SODIUM SERPL-SCNC: 133 MMOL/L — LOW (ref 135–146)
WBC # BLD: 4.46 K/UL — LOW (ref 4.8–10.8)
WBC # FLD AUTO: 4.46 K/UL — LOW (ref 4.8–10.8)

## 2021-04-07 PROCEDURE — 99233 SBSQ HOSP IP/OBS HIGH 50: CPT

## 2021-04-07 RX ADMIN — Medication 10 MILLILITER(S): at 05:51

## 2021-04-07 RX ADMIN — Medication 10 MILLILITER(S): at 18:02

## 2021-04-07 RX ADMIN — Medication 10 MILLILITER(S): at 11:35

## 2021-04-07 RX ADMIN — Medication 1: at 17:48

## 2021-04-07 RX ADMIN — Medication 10 MILLILITER(S): at 23:39

## 2021-04-07 RX ADMIN — Medication 4: at 11:34

## 2021-04-07 RX ADMIN — Medication 10 MILLILITER(S): at 00:05

## 2021-04-07 RX ADMIN — PANTOPRAZOLE SODIUM 40 MILLIGRAM(S): 20 TABLET, DELAYED RELEASE ORAL at 07:08

## 2021-04-07 RX ADMIN — CHLORHEXIDINE GLUCONATE 1 APPLICATION(S): 213 SOLUTION TOPICAL at 05:49

## 2021-04-07 RX ADMIN — Medication 2: at 08:14

## 2021-04-07 RX ADMIN — ENOXAPARIN SODIUM 40 MILLIGRAM(S): 100 INJECTION SUBCUTANEOUS at 17:50

## 2021-04-07 RX ADMIN — ENOXAPARIN SODIUM 40 MILLIGRAM(S): 100 INJECTION SUBCUTANEOUS at 05:50

## 2021-04-07 RX ADMIN — Medication 1 TABLET(S): at 11:35

## 2021-04-07 RX ADMIN — Medication 6 MILLIGRAM(S): at 05:50

## 2021-04-07 RX ADMIN — REMDESIVIR 500 MILLIGRAM(S): 5 INJECTION INTRAVENOUS at 17:51

## 2021-04-07 NOTE — PROGRESS NOTE ADULT - SUBJECTIVE AND OBJECTIVE BOX
SOSA COLLADO  45y, Female  Allergy: No Known Allergies      LOS  5d    CHIEF COMPLAINT: Shortness of breath (07 Apr 2021 07:08)      INTERVAL EVENTS/HPI  - HFNC  - T(F): , Max: 98.3 (04-06-21 @ 21:00)  - WBC Count: 4.46 (04-07-21 @ 09:10)  WBC Count: 3.02 (04-06-21 @ 09:09)     - Creatinine, Serum: 0.7 (04-07-21 @ 09:10)  Creatinine, Serum: 0.7 (04-06-21 @ 09:09)       COVID-19 Raymundo Domain AB Interp: Negative (04-03-21 @ 09:04)      ROS:  9-point ROS performed and negative except as per above    VITALS:  T(F): 96, Max: 98.3 (04-06-21 @ 21:00)  HR: 88  BP: 112/68  RR: 20Vital Signs Last 24 Hrs  T(C): 35.6 (07 Apr 2021 05:31), Max: 36.8 (06 Apr 2021 21:00)  T(F): 96 (07 Apr 2021 05:31), Max: 98.3 (06 Apr 2021 21:00)  HR: 88 (07 Apr 2021 09:30) (64 - 88)  BP: 112/68 (07 Apr 2021 05:31) (112/68 - 119/81)  BP(mean): --  RR: 20 (07 Apr 2021 12:16) (18 - 21)  SpO2: 96% (07 Apr 2021 12:16) (94% - 96%)    FH: Non-contributory  Social Hx: Non-contributory    TESTS & MEASUREMENTS:                        13.4   4.46  )-----------( 330      ( 07 Apr 2021 09:10 )             42.2     04-07    133<L>  |  96<L>  |  20  ----------------------------<  289<H>  5.2<H>   |  29  |  0.7    Ca    8.7      07 Apr 2021 09:10  Mg     2.4     04-06    TPro  6.2  /  Alb  3.6  /  TBili  0.7  /  DBili  x   /  AST  88<H>  /  ALT  98<H>  /  AlkPhos  54  04-07    eGFR if Non African American: 105 mL/min/1.73M2 (04-07-21 @ 09:10)  eGFR if African American: 121 mL/min/1.73M2 (04-07-21 @ 09:10)    LIVER FUNCTIONS - ( 07 Apr 2021 09:10 )  Alb: 3.6 g/dL / Pro: 6.2 g/dL / ALK PHOS: 54 U/L / ALT: 98 U/L / AST: 88 U/L / GGT: x                     INFECTIOUS DISEASES TESTING  Procalcitonin, Serum: 0.30 (04-02-21 @ 14:20)  COVID-19 PCR: Detected (04-02-21 @ 14:08)      INFLAMMATORY MARKERS  C-Reactive Protein, Serum: 13 mg/L (04-05-21 @ 07:54)  C-Reactive Protein, Serum: 28 mg/L (04-04-21 @ 07:47)  C-Reactive Protein, Serum: 73 mg/L (04-02-21 @ 14:20)      RADIOLOGY & ADDITIONAL TESTS:  I have personally reviewed the last available Chest xray  CXR      CT      CARDIOLOGY TESTING  12 Lead ECG:   Ventricular Rate 95 BPM    Atrial Rate 95 BPM    P-R Interval 114 ms    QRS Duration 84 ms    Q-T Interval 370 ms    QTC Calculation(Bazett) 464 ms    P Axis -7 degrees    R Axis -8 degrees    T Axis 8 degrees    Diagnosis Line Normal sinus rhythm  Normal ECG    Confirmed by CLARICE HUTTON MD ([id number]) on 4/2/2021 3:29:52 PM (04-02-21 @ 14:17)      MEDICATIONS  chlorhexidine 4% Liquid 1 Topical <User Schedule>  dexAMETHasone  Injectable 6 IV Push daily  dextrose 5%. 1000 IV Continuous <Continuous>  enoxaparin Injectable 40 SubCutaneous two times a day  glucagon  Injectable 1 IntraMuscular once  guaifenesin/dextromethorphan  Syrup 10 Oral every 6 hours  insulin lispro (ADMELOG) corrective regimen sliding scale  SubCutaneous three times a day before meals  multivitamin 1 Oral daily  pantoprazole    Tablet 40 Oral before breakfast  remdesivir  IVPB  IV Intermittent   remdesivir  IVPB 100 IV Intermittent every 24 hours      WEIGHT  Weight (kg): 102.6 (04-03-21 @ 11:21)  Creatinine, Serum: 0.7 mg/dL (04-07-21 @ 09:10)      ANTIBIOTICS:  remdesivir  IVPB 100 milliGRAM(s) IV Intermittent every 24 hours  remdesivir  IVPB   IV Intermittent       All available historical records have been reviewed

## 2021-04-07 NOTE — PROGRESS NOTE ADULT - ASSESSMENT
ASSESSMENT  45 year old F patient with no pertinent past medical Hx presented to the ED for shortness of breath of 1 week duration.     IMPRESSION  #COVID19 PNA, Severe (O2 < or = 94% on RA and requiring supplemental O2)    CXR bilateral hazy opacities     Admission WBC 3    AB (-)  C-Reactive Protein, Serum: 73 mg/L (04-02-21 @ 14:20)  D-Dimer Assay, Quantitative: 167 ng/mL DDU (04-02-21 @ 14:20)  Ferritin, Serum: 3872 ng/mL (04-02-21 @ 14:20)  Procalcitonin, Serum: 0.30 ng/mL (04-02-21 @ 14:20)    #Sepsis on admission T>101F, Pulse>90  #Transaminitis     Creatinine, Serum: 0.7 (04-02-21 @ 14:20)      RECOMMENDATIONS  - Remdesivir D1: 200mg x1, Day 2 - Day 5 100mg IV daily. Monitor Cr/LFTs  - s/p Tocilizumab 4/3   - s/p 4/4 convalescent Plasma: 1 unit over 2h  (Please  patient- will not be able to receive vaccine x 90 days)  - Dexamethasone 6mg x 10 days or until Discharge (whichever is shorter), any taper per Pulm  - A/C per primary team  - Prone positioning if possible  - Monitor off Abx     If any questions, please call or send a message on [employer] Teams  Spectra [phone]

## 2021-04-07 NOTE — PROGRESS NOTE ADULT - ASSESSMENT
Patient is a 45 year old Female  patient with no pertinent past medical Hx presented to the ED for shortness of breath of 1 week duration. dx. with hypoxia due to covid viral Pneumonia    #Acute hypoxic respiratory failure 2/2 COVID PNA + cytokine storm +transaminitis  - patient had increased oxygen requirements yesterday - currently on 15L NRB @93% . on ambulation went down to 87%. switch to HFNC for oral intake   - 1st positive on 03/28  - CXR 4/03 - w diffuse b/l opacities; +leukopenia.   - repeat CXR 4/06- decreased b/l opacities   - crp 4/05 73>28 -13   - procal 4/02 -> .3  - ferritin 4/05 3872>2443,   - ddimer 4/05 167>>152  - s/p toci 4/3, plasma 4/4  - last day of RDV for 5 days (day 1 4/3-4/7),  - continue dex for 10 days (day 1 4/3 4/9)  - Repeat inflamm markers q48  - wean O2 as tolerated    DVT ppx: Lovenox 40 mg SubQ bid (BMI >30)   GI ppx: not recommended  Diet: Regular  Activity: IAT  FULL CODE    Pending: clinical improvement    Patient is a 45 year old Female  patient with no pertinent past medical Hx presented to the ED for shortness of breath of 1 week duration. dx. with hypoxia due to covid viral Pneumonia    #Acute hypoxic respiratory failure 2/2 COVID PNA + cytokine storm +transaminitis  - patient had increased oxygen requirements yesterday - currently on 15L NRB @93% . on ambulation went down to 87%. switch to HFNC for oral intake   - 1st positive on 03/28  - CXR 4/03 - w diffuse b/l opacities; +leukopenia.   - repeat CXR 4/06- decreased b/l opacities   - crp 4/05 73>28 -13   - procal 4/02 -> .3  - ferritin 4/05 3872>2443,   - ddimer 4/05 167>>152  - s/p toci 4/3, plasma 4/4  - last day of RDV for 5 days (day 1 4/3-4/7),  - continue dex for 10 days (day 1 4/3 4/9)  - Repeat inflamm markers q48  - wean O2 as tolerated    #Hyperglycemia 2/2 steroid use  - fingersticks monitoring  - started on sliding scale    DVT ppx: Lovenox 40 mg SubQ bid (BMI >30)   GI ppx: not recommended  Diet: Regular  Activity: IAT  FULL CODE    Pending: clinical improvement

## 2021-04-07 NOTE — PROGRESS NOTE ADULT - SUBJECTIVE AND OBJECTIVE BOX
SOSA COLLADO 45y Female  MRN#: [id number]     SUBJECTIVE  Patient is a 45y old Female who presents with a chief complaint of Shortness of breath (03 Apr 2021 11:12)  Currently admitted to medicine with the primary diagnosis of COVID-19     Today is hospital day 3d, and this morning she is resting comfortably and reports no overnight events.       OBJECTIVE  PAST MEDICAL & SURGICAL HISTORY  No pertinent past medical history      ALLERGIES:  No Known Allergies    MEDICATIONS:    MEDICATIONS  (STANDING):  chlorhexidine 4% Liquid 1 Application(s) Topical <User Schedule>  dexAMETHasone  Injectable 6 milliGRAM(s) IV Push daily  dextrose 5%. 1000 milliLiter(s) (100 mL/Hr) IV Continuous <Continuous>  enoxaparin Injectable 40 milliGRAM(s) SubCutaneous two times a day  glucagon  Injectable 1 milliGRAM(s) IntraMuscular once  guaifenesin/dextromethorphan  Syrup 10 milliLiter(s) Oral every 6 hours  insulin lispro (ADMELOG) corrective regimen sliding scale   SubCutaneous three times a day before meals  multivitamin 1 Tablet(s) Oral daily  pantoprazole    Tablet 40 milliGRAM(s) Oral before breakfast  remdesivir  IVPB   IV Intermittent   remdesivir  IVPB 100 milliGRAM(s) IV Intermittent every 24 hours    MEDICATIONS  (PRN):  acetaminophen    Suspension .. 650 milliGRAM(s) Oral every 6 hours PRN Temp greater or equal to 38C (100.4F)  ibuprofen  Tablet. 600 milliGRAM(s) Oral every 6 hours PRN Temp greater or equal to 38C (100.4F), Mild Pain (1 - 3)          VITAL SIGNS: Last 24 Hours    ICU Vital Signs Last 24 Hrs  T(C): 35.6 (07 Apr 2021 05:31), Max: 36.8 (06 Apr 2021 21:00)  T(F): 96 (07 Apr 2021 05:31), Max: 98.3 (06 Apr 2021 21:00)  HR: 64 (07 Apr 2021 05:31) (64 - 72)  BP: 112/68 (07 Apr 2021 05:31) (112/68 - 134/85)  RR: 18 (07 Apr 2021 05:31) (18 - 19)  SpO2: 94% (07 Apr 2021 05:31) (91% - 95%)        LABS:                          12.8   3.02  )-----------( 321      ( 06 Apr 2021 09:09 )             40.2 04-06    137  |  99  |  20  ----------------------------<  354  4.7   |  26  |  0.7    Ca    8.4      06 Apr 2021 09:09Mg     2.4     04-06  TPro  6.4  /  Alb  3.5  /  TBili  0.6  /  DBili  x   /  AST  80  /  ALT  85  /  AlkPhos  52  04-06    Procalcitonin, Serum: 0.30 (04-02)    C-Reactive Protein, Serum: 13 (04-05)  C-Reactive Protein, Serum: 28 (04-04)  C-Reactive Protein, Serum: 73 (04-02)    Ferritin, Serum: 1902 (04-05)  Ferritin, Serum: 2443 (04-04)  Ferritin, Serum: 3872 (04-02)      D-Dimer Assay, Quantitative: 152 (04-05)  D-Dimer Assay, Quantitative: 174 (04-04)  D-Dimer Assay, Quantitative: 167 (04-02)                            12.6   2.99  )-----------( 316      ( 05 Apr 2021 07:54 )             40.1 04-05    143  |  103  |  21  ----------------------------<  287  4.6   |  28  |  0.6    Ca    9.1      05 Apr 2021 07:54    TPro  6.6  /  Alb  3.6  /  TBili  0.3  /  DBili  x   /  AST  61  /  ALT  68  /  AlkPhos  55  04-05    Procalcitonin, Serum: 0.30 (04-02)    C-Reactive Protein, Serum: 13 (04-05)  C-Reactive Protein, Serum: 28 (04-04)  C-Reactive Protein, Serum: 73 (04-02)    Ferritin, Serum: 1902 (04-05)  Ferritin, Serum: 2443 (04-04)  Ferritin, Serum: 3872 (04-02)    D-Dimer Assay, Quantitative: 152 (04-05)  D-Dimer Assay, Quantitative: 174 (04-04)  D-Dimer Assay, Quantitative: 167 (04-02)                                   RADIOLOGY:    < from: Xray Chest 1 View- PORTABLE-Routine (Xray Chest 1 View- PORTABLE-Routine in AM.) (04.06.21 @ 06:49) >  IMPRESSION:    Decreased bilateral opacities.    < end of copied text >    Xray Chest 1 View- PORTABLE-Urgent (04.02.21 @ 14:56)  Impression:  New bilateral diffuse lung opacities, which can be seen with infection including atypical infection such as viral (Covid is not excluded)    Xray Chest 1 View- PORTABLE-Urgent (Xray Chest 1 View- PORTABLE-Urgent .) (04.03.21 @ 12:06)  Impression:  Bilateral opacities, stable.  `  PHYSICAL EXAM:  GENERAL: NAD, AAOx3  HEENT:  Atraumatic, Normocephalic.   PULMONARY: crackles heard b/l; No wheeze  CARDIOVASCULAR: Regular rate and rhythm; No murmurs  GASTROINTESTINAL: Soft, Nontender, Nondistended; Bowel sounds present  MUSCULOSKELETAL:   No clubbing, cyanosis, or edema  NEUROLOGY: non-focal  SKIN: No rashes or lesions  `

## 2021-04-08 LAB
ALBUMIN SERPL ELPH-MCNC: 3.7 G/DL — SIGNIFICANT CHANGE UP (ref 3.5–5.2)
ALP SERPL-CCNC: 56 U/L — SIGNIFICANT CHANGE UP (ref 30–115)
ALT FLD-CCNC: 103 U/L — HIGH (ref 0–41)
ANION GAP SERPL CALC-SCNC: 10 MMOL/L — SIGNIFICANT CHANGE UP (ref 7–14)
AST SERPL-CCNC: 76 U/L — HIGH (ref 0–41)
BASOPHILS # BLD AUTO: 0.01 K/UL — SIGNIFICANT CHANGE UP (ref 0–0.2)
BASOPHILS NFR BLD AUTO: 0.2 % — SIGNIFICANT CHANGE UP (ref 0–1)
BILIRUB SERPL-MCNC: 0.4 MG/DL — SIGNIFICANT CHANGE UP (ref 0.2–1.2)
BUN SERPL-MCNC: 22 MG/DL — HIGH (ref 10–20)
CALCIUM SERPL-MCNC: 9.1 MG/DL — SIGNIFICANT CHANGE UP (ref 8.5–10.1)
CHLORIDE SERPL-SCNC: 101 MMOL/L — SIGNIFICANT CHANGE UP (ref 98–110)
CO2 SERPL-SCNC: 28 MMOL/L — SIGNIFICANT CHANGE UP (ref 17–32)
CREAT SERPL-MCNC: 1 MG/DL — SIGNIFICANT CHANGE UP (ref 0.7–1.5)
D DIMER BLD IA.RAPID-MCNC: 271 NG/ML DDU — HIGH (ref 0–230)
EOSINOPHIL # BLD AUTO: 0.08 K/UL — SIGNIFICANT CHANGE UP (ref 0–0.7)
EOSINOPHIL NFR BLD AUTO: 1.7 % — SIGNIFICANT CHANGE UP (ref 0–8)
GLUCOSE BLDC GLUCOMTR-MCNC: 189 MG/DL — HIGH (ref 70–99)
GLUCOSE BLDC GLUCOMTR-MCNC: 217 MG/DL — HIGH (ref 70–99)
GLUCOSE BLDC GLUCOMTR-MCNC: 279 MG/DL — HIGH (ref 70–99)
GLUCOSE BLDC GLUCOMTR-MCNC: 338 MG/DL — HIGH (ref 70–99)
GLUCOSE SERPL-MCNC: 197 MG/DL — HIGH (ref 70–99)
HCT VFR BLD CALC: 43 % — SIGNIFICANT CHANGE UP (ref 37–47)
HGB BLD-MCNC: 13.7 G/DL — SIGNIFICANT CHANGE UP (ref 12–16)
IMM GRANULOCYTES NFR BLD AUTO: 1.3 % — HIGH (ref 0.1–0.3)
LYMPHOCYTES # BLD AUTO: 1.03 K/UL — LOW (ref 1.2–3.4)
LYMPHOCYTES # BLD AUTO: 22.1 % — SIGNIFICANT CHANGE UP (ref 20.5–51.1)
MCHC RBC-ENTMCNC: 27.6 PG — SIGNIFICANT CHANGE UP (ref 27–31)
MCHC RBC-ENTMCNC: 31.9 G/DL — LOW (ref 32–37)
MCV RBC AUTO: 86.5 FL — SIGNIFICANT CHANGE UP (ref 81–99)
MONOCYTES # BLD AUTO: 0.32 K/UL — SIGNIFICANT CHANGE UP (ref 0.1–0.6)
MONOCYTES NFR BLD AUTO: 6.9 % — SIGNIFICANT CHANGE UP (ref 1.7–9.3)
NEUTROPHILS # BLD AUTO: 3.16 K/UL — SIGNIFICANT CHANGE UP (ref 1.4–6.5)
NEUTROPHILS NFR BLD AUTO: 67.8 % — SIGNIFICANT CHANGE UP (ref 42.2–75.2)
NRBC # BLD: 0 /100 WBCS — SIGNIFICANT CHANGE UP (ref 0–0)
PLATELET # BLD AUTO: 347 K/UL — SIGNIFICANT CHANGE UP (ref 130–400)
POTASSIUM SERPL-MCNC: 4.8 MMOL/L — SIGNIFICANT CHANGE UP (ref 3.5–5)
POTASSIUM SERPL-SCNC: 4.8 MMOL/L — SIGNIFICANT CHANGE UP (ref 3.5–5)
PROT SERPL-MCNC: 6.3 G/DL — SIGNIFICANT CHANGE UP (ref 6–8)
RBC # BLD: 4.97 M/UL — SIGNIFICANT CHANGE UP (ref 4.2–5.4)
RBC # FLD: 13.1 % — SIGNIFICANT CHANGE UP (ref 11.5–14.5)
SODIUM SERPL-SCNC: 139 MMOL/L — SIGNIFICANT CHANGE UP (ref 135–146)
WBC # BLD: 4.66 K/UL — LOW (ref 4.8–10.8)
WBC # FLD AUTO: 4.66 K/UL — LOW (ref 4.8–10.8)

## 2021-04-08 PROCEDURE — 99233 SBSQ HOSP IP/OBS HIGH 50: CPT

## 2021-04-08 RX ADMIN — Medication 2: at 17:03

## 2021-04-08 RX ADMIN — Medication 3: at 08:13

## 2021-04-08 RX ADMIN — Medication 4: at 11:38

## 2021-04-08 RX ADMIN — CHLORHEXIDINE GLUCONATE 1 APPLICATION(S): 213 SOLUTION TOPICAL at 05:17

## 2021-04-08 RX ADMIN — PANTOPRAZOLE SODIUM 40 MILLIGRAM(S): 20 TABLET, DELAYED RELEASE ORAL at 06:26

## 2021-04-08 RX ADMIN — ENOXAPARIN SODIUM 40 MILLIGRAM(S): 100 INJECTION SUBCUTANEOUS at 17:02

## 2021-04-08 RX ADMIN — Medication 6 MILLIGRAM(S): at 05:18

## 2021-04-08 RX ADMIN — Medication 10 MILLILITER(S): at 05:18

## 2021-04-08 RX ADMIN — Medication 1 TABLET(S): at 11:38

## 2021-04-08 RX ADMIN — ENOXAPARIN SODIUM 40 MILLIGRAM(S): 100 INJECTION SUBCUTANEOUS at 05:18

## 2021-04-08 NOTE — PROGRESS NOTE ADULT - ASSESSMENT
Patient is a 45 year old Female  patient with no pertinent past medical Hx presented to the ED for shortness of breath of 1 week duration. dx. with hypoxia due to covid viral Pneumonia.     #Acute hypoxic respiratory failure 2/2 COVID PNA + cytokine storm +transaminitis  - Patient had increased oxygen requirements (desaturated to 87%) on 4/6 from 15L NRB @93% > now on High-flow 40L/70% saturating 97%, respiratory to titrate down  -Covid positive on 03/28  - CXR 4/03 - w diffuse b/l opacities; +leukopenia.   - Repeat CXR 4/06- decreased b/l opacities   - Crp 4/05 73>28 -13   - procal 4/02 -> .3  - ferritin 4/05 3872>2443,   - ddimer 4/05 167>>152  - s/p toci 4/3, plasma 4/4  - s/p RDV (4/3-4/7)  - C/w Dex for 10 days (day 1 4/3 4/9) (Today is Day #7)  - Repeat inflamm markers q48  - Wean O2 as tolerated  - Incentive spirometer     #Hyperglycemia 2/2 steroid use  - fingersticks monitoring  - started on sliding scale    DVT ppx: Lovenox 40 mg SubQ bid (BMI >30)   GI ppx: not recommended  Diet: Regular  Activity: IAT  FULL CODE    Pending: clinical improvement    Patient is a 45 year old Female  patient with no pertinent past medical Hx presented to the ED for shortness of breath of 1 week duration. dx. with hypoxia due to covid viral Pneumonia.     #Acute hypoxic respiratory failure 2/2 COVID PNA + cytokine storm +transaminitis  - Patient had increased oxygen requirements (desaturated to 87%) on 4/6 from 15L NRB @93% > now on High-flow 40L/70% saturating 97%, respiratory to titrate down  -Covid positive on 03/28  - CXR 4/03 - w diffuse b/l opacities; +leukopenia.   - Repeat CXR 4/06- decreased b/l opacities   - Crp 4/05 73>28 -13   - procal 4/02 -> .3  - ferritin 4/05 3872>2443,   - ddimer 4/05 167>>152  - s/p toci 4/3, plasma 4/4  - s/p RDV (4/3-4/7)  - C/w Dex for 10 days (day 1 4/3 4/9) (Today is Day #7)  - Repeat inflamm markers q48  - Wean O2 as tolerated  - Incentive spirometer     #Transaminitis  - Likely 2/2 COVID PNA  - AST 76/ [id number]    #Hyperglycemia 2/2 steroid use  - fingersticks monitoring  - started on sliding scale    DVT ppx: Lovenox 40 mg SubQ bid (BMI >30)   GI ppx: not recommended  Diet: Regular  Activity: IAT  FULL CODE    Pending: clinical improvement

## 2021-04-08 NOTE — PROGRESS NOTE ADULT - SUBJECTIVE AND OBJECTIVE BOX
SOSA COLLADO 45y Female  MRN#: [id number]   Hospital Day: 6d    SUBJECTIVE  Patient is a 45y old Female who presents with a chief complaint of Shortness of breath (07 Apr 2021 12:26)  Currently admitted to medicine with the primary diagnosis of COVID-19      INTERVAL HPI AND OVERNIGHT EVENTS:  Patient was examined and seen at bedside. This morning she is resting comfortably in bed and reports no issues or overnight events. On High-flow 40L/70% saturating 97%.     REVIEW OF SYMPTOMS:  CONSTITUTIONAL: No weakness, fevers or chills; No headaches  EYES: No visual changes, eye pain, or discharge  ENT: No vertigo; No ear pain or change in hearing; No sore throat or difficulty swallowing  NECK: No pain or stiffness  RESPIRATORY: No cough, wheezing, or hemoptysis; No shortness of breath  CARDIOVASCULAR: No chest pain or palpitations  GASTROINTESTINAL: No abdominal or epigastric pain; No nausea, vomiting, or hematemesis; No diarrhea or constipation; No melena or hematochezia  GENITOURINARY: No dysuria, frequency or hematuria  MUSCULOSKELETAL: No joint pain, no muscle pain, no weakness  NEUROLOGICAL: No numbness or weakness  SKIN: No itching or rashes    OBJECTIVE  PAST MEDICAL & SURGICAL HISTORY  No pertinent past medical history      ALLERGIES:  No Known Allergies    MEDICATIONS:  STANDING MEDICATIONS  chlorhexidine 4% Liquid 1 Application(s) Topical <User Schedule>  dexAMETHasone  Injectable 6 milliGRAM(s) IV Push daily  dextrose 5%. 1000 milliLiter(s) IV Continuous <Continuous>  enoxaparin Injectable 40 milliGRAM(s) SubCutaneous two times a day  glucagon  Injectable 1 milliGRAM(s) IntraMuscular once  insulin lispro (ADMELOG) corrective regimen sliding scale   SubCutaneous three times a day before meals  multivitamin 1 Tablet(s) Oral daily  pantoprazole    Tablet 40 milliGRAM(s) Oral before breakfast    PRN MEDICATIONS  acetaminophen    Suspension .. 650 milliGRAM(s) Oral every 6 hours PRN  ibuprofen  Tablet. 600 milliGRAM(s) Oral every 6 hours PRN      VITAL SIGNS: Last 24 Hours  T(C): 36.4 (08 Apr 2021 05:32), Max: 36.7 (07 Apr 2021 12:26)  T(F): 97.6 (08 Apr 2021 05:32), Max: 98.1 (07 Apr 2021 12:26)  HR: 74 (08 Apr 2021 05:32) (60 - 74)  BP: 108/72 (08 Apr 2021 05:32) (100/59 - 113/70)  BP(mean): --  RR: 19 (08 Apr 2021 05:32) (19 - 20)  SpO2: 98% (08 Apr 2021 08:54) (92% - 98%)    LABS:                        13.7   4.66  )-----------( 347      ( 08 Apr 2021 06:16 )             43.0     04-08    139  |  101  |  22<H>  ----------------------------<  197<H>  4.8   |  28  |  1.0    Ca    9.1      08 Apr 2021 06:16    TPro  6.3  /  Alb  3.7  /  TBili  0.4  /  DBili  x   /  AST  76<H>  /  ALT  103<H>  /  AlkPhos  56  04-08                  RADIOLOGY:      PHYSICAL EXAM:  CONSTITUTIONAL: No acute distress, well-developed, well-groomed, AAOx3  HEAD: Atraumatic, normocephalic  EYES: EOM intact, PERRLA, conjunctiva and sclera clear  ENT: Supple, no masses, no thyromegaly, no bruits, no JVD; moist mucous membranes  PULMONARY: Diminished to auscultation bilaterally; no wheezes, rales, or rhonchi  CARDIOVASCULAR: Regular rate and rhythm; no murmurs, rubs, or gallops  GASTROINTESTINAL: Soft, non-tender, non-distended; bowel sounds present  MUSCULOSKELETAL: 2+ peripheral pulses; no clubbing, no cyanosis, no edema  NEUROLOGY: non-focal  SKIN: No rashes or lesions; warm and dry

## 2021-04-09 LAB
ANION GAP SERPL CALC-SCNC: 14 MMOL/L — SIGNIFICANT CHANGE UP (ref 7–14)
BASOPHILS # BLD AUTO: 0.01 K/UL — SIGNIFICANT CHANGE UP (ref 0–0.2)
BASOPHILS NFR BLD AUTO: 0.2 % — SIGNIFICANT CHANGE UP (ref 0–1)
BUN SERPL-MCNC: 19 MG/DL — SIGNIFICANT CHANGE UP (ref 10–20)
CALCIUM SERPL-MCNC: 8.8 MG/DL — SIGNIFICANT CHANGE UP (ref 8.5–10.1)
CHLORIDE SERPL-SCNC: 98 MMOL/L — SIGNIFICANT CHANGE UP (ref 98–110)
CO2 SERPL-SCNC: 22 MMOL/L — SIGNIFICANT CHANGE UP (ref 17–32)
CREAT SERPL-MCNC: 0.7 MG/DL — SIGNIFICANT CHANGE UP (ref 0.7–1.5)
CRP SERPL-MCNC: 4 MG/L — SIGNIFICANT CHANGE UP
EOSINOPHIL # BLD AUTO: 0.11 K/UL — SIGNIFICANT CHANGE UP (ref 0–0.7)
EOSINOPHIL NFR BLD AUTO: 1.9 % — SIGNIFICANT CHANGE UP (ref 0–8)
GLUCOSE BLDC GLUCOMTR-MCNC: 225 MG/DL — HIGH (ref 70–99)
GLUCOSE BLDC GLUCOMTR-MCNC: 226 MG/DL — HIGH (ref 70–99)
GLUCOSE BLDC GLUCOMTR-MCNC: 252 MG/DL — HIGH (ref 70–99)
GLUCOSE BLDC GLUCOMTR-MCNC: 298 MG/DL — HIGH (ref 70–99)
GLUCOSE SERPL-MCNC: 337 MG/DL — HIGH (ref 70–99)
HCT VFR BLD CALC: 44.6 % — SIGNIFICANT CHANGE UP (ref 37–47)
HGB BLD-MCNC: 14.3 G/DL — SIGNIFICANT CHANGE UP (ref 12–16)
IMM GRANULOCYTES NFR BLD AUTO: 1.6 % — HIGH (ref 0.1–0.3)
LYMPHOCYTES # BLD AUTO: 0.6 K/UL — LOW (ref 1.2–3.4)
LYMPHOCYTES # BLD AUTO: 10.5 % — LOW (ref 20.5–51.1)
MAGNESIUM SERPL-MCNC: 2.2 MG/DL — SIGNIFICANT CHANGE UP (ref 1.8–2.4)
MCHC RBC-ENTMCNC: 27.6 PG — SIGNIFICANT CHANGE UP (ref 27–31)
MCHC RBC-ENTMCNC: 32.1 G/DL — SIGNIFICANT CHANGE UP (ref 32–37)
MCV RBC AUTO: 85.9 FL — SIGNIFICANT CHANGE UP (ref 81–99)
MONOCYTES # BLD AUTO: 0.21 K/UL — SIGNIFICANT CHANGE UP (ref 0.1–0.6)
MONOCYTES NFR BLD AUTO: 3.7 % — SIGNIFICANT CHANGE UP (ref 1.7–9.3)
NEUTROPHILS # BLD AUTO: 4.68 K/UL — SIGNIFICANT CHANGE UP (ref 1.4–6.5)
NEUTROPHILS NFR BLD AUTO: 82.1 % — HIGH (ref 42.2–75.2)
NRBC # BLD: 0 /100 WBCS — SIGNIFICANT CHANGE UP (ref 0–0)
PLATELET # BLD AUTO: 406 K/UL — HIGH (ref 130–400)
POTASSIUM SERPL-MCNC: 5 MMOL/L — SIGNIFICANT CHANGE UP (ref 3.5–5)
POTASSIUM SERPL-SCNC: 5 MMOL/L — SIGNIFICANT CHANGE UP (ref 3.5–5)
RBC # BLD: 5.19 M/UL — SIGNIFICANT CHANGE UP (ref 4.2–5.4)
RBC # FLD: 12.9 % — SIGNIFICANT CHANGE UP (ref 11.5–14.5)
SODIUM SERPL-SCNC: 134 MMOL/L — LOW (ref 135–146)
WBC # BLD: 5.7 K/UL — SIGNIFICANT CHANGE UP (ref 4.8–10.8)
WBC # FLD AUTO: 5.7 K/UL — SIGNIFICANT CHANGE UP (ref 4.8–10.8)

## 2021-04-09 PROCEDURE — 99233 SBSQ HOSP IP/OBS HIGH 50: CPT

## 2021-04-09 RX ADMIN — PANTOPRAZOLE SODIUM 40 MILLIGRAM(S): 20 TABLET, DELAYED RELEASE ORAL at 05:48

## 2021-04-09 RX ADMIN — ENOXAPARIN SODIUM 40 MILLIGRAM(S): 100 INJECTION SUBCUTANEOUS at 18:24

## 2021-04-09 RX ADMIN — Medication 3: at 16:51

## 2021-04-09 RX ADMIN — ENOXAPARIN SODIUM 40 MILLIGRAM(S): 100 INJECTION SUBCUTANEOUS at 05:48

## 2021-04-09 RX ADMIN — Medication 1 TABLET(S): at 11:52

## 2021-04-09 RX ADMIN — Medication 3: at 11:52

## 2021-04-09 RX ADMIN — Medication 2: at 08:09

## 2021-04-09 RX ADMIN — Medication 6 MILLIGRAM(S): at 05:48

## 2021-04-09 RX ADMIN — CHLORHEXIDINE GLUCONATE 1 APPLICATION(S): 213 SOLUTION TOPICAL at 05:48

## 2021-04-09 NOTE — DIETITIAN INITIAL EVALUATION ADULT. - PERTINENT MEDS FT
MEDICATIONS  (STANDING):  dexAMETHasone  Injectable 6 milliGRAM(s) IV Push daily  enoxaparin Injectable 40 milliGRAM(s) SubCutaneous two times a day  insulin lispro (ADMELOG) corrective regimen sliding scale   SubCutaneous three times a day before meals  multivitamin 1 Tablet(s) Oral daily  pantoprazole    Tablet 40 milliGRAM(s) Oral before breakfast

## 2021-04-09 NOTE — DIETITIAN INITIAL EVALUATION ADULT. - OTHER INFO
Admit for SOB. Acute hypoxic respiratory failure 2/2 COVID PNA + cytokine storm +transaminitis. on high flow. Hyperglycemia 2/2 steroid use.

## 2021-04-09 NOTE — DIETITIAN INITIAL EVALUATION ADULT. - OTHER CALCULATIONS
Calories; 1722 - 1894 kcals (MSJ x 1.0 - 1.1 AF obesity) Protein: 64 -77 gms (1.0 - 1.2 gm/kg IBW) Fluid: 1ml/kcal or per LIP

## 2021-04-09 NOTE — PROGRESS NOTE ADULT - SUBJECTIVE AND OBJECTIVE BOX
SOSA COLLADO  45y, Female  Allergy: No Known Allergies      LOS  7d    CHIEF COMPLAINT: Shortness of breath (08 Apr 2021 11:01)      INTERVAL EVENTS/HPI  - HFNC  - T(F): , Max: 98.5 (04-08-21 @ 12:12)  - WBC Count: 5.70 (04-09-21 @ 09:29)  WBC Count: 4.66 (04-08-21 @ 06:16)     - Creatinine, Serum: 1.0 (04-08-21 @ 06:16)       COVID-19 Raymundo Domain AB Interp: Negative (04-03-21 @ 09:04)      ROS:  9-point ROS performed and negative except as per above    VITALS:  T(F): 96, Max: 98.5 (04-08-21 @ 12:12)  HR: 65  BP: 110/76  RR: 18Vital Signs Last 24 Hrs  T(C): 35.6 (09 Apr 2021 05:00), Max: 36.9 (08 Apr 2021 12:12)  T(F): 96 (09 Apr 2021 05:00), Max: 98.5 (08 Apr 2021 12:12)  HR: 65 (09 Apr 2021 07:54) (65 - 77)  BP: 110/76 (09 Apr 2021 05:00) (103/61 - 112/71)  BP(mean): --  RR: 18 (09 Apr 2021 05:00) (18 - 20)  SpO2: 96% (09 Apr 2021 07:54) (90% - 96%)    FH: Non-contributory  Social Hx: Non-contributory    TESTS & MEASUREMENTS:                        14.3   5.70  )-----------( 406      ( 09 Apr 2021 09:29 )             44.6     04-08    139  |  101  |  22<H>  ----------------------------<  197<H>  4.8   |  28  |  1.0    Ca    9.1      08 Apr 2021 06:16    TPro  6.3  /  Alb  3.7  /  TBili  0.4  /  DBili  x   /  AST  76<H>  /  ALT  103<H>  /  AlkPhos  56  04-08      LIVER FUNCTIONS - ( 08 Apr 2021 06:16 )  Alb: 3.7 g/dL / Pro: 6.3 g/dL / ALK PHOS: 56 U/L / ALT: 103 U/L / AST: 76 U/L / GGT: x                     INFECTIOUS DISEASES TESTING  Procalcitonin, Serum: 0.30 (04-02-21 @ 14:20)  COVID-19 PCR: Detected (04-02-21 @ 14:08)      INFLAMMATORY MARKERS  C-Reactive Protein, Serum: 4 mg/L (04-08-21 @ 06:16)  C-Reactive Protein, Serum: 13 mg/L (04-05-21 @ 07:54)  C-Reactive Protein, Serum: 28 mg/L (04-04-21 @ 07:47)  C-Reactive Protein, Serum: 73 mg/L (04-02-21 @ 14:20)      RADIOLOGY & ADDITIONAL TESTS:  I have personally reviewed the last available Chest xray  CXR      CT      CARDIOLOGY TESTING  12 Lead ECG:   Ventricular Rate 95 BPM    Atrial Rate 95 BPM    P-R Interval 114 ms    QRS Duration 84 ms    Q-T Interval 370 ms    QTC Calculation(Bazett) 464 ms    P Axis -7 degrees    R Axis -8 degrees    T Axis 8 degrees    Diagnosis Line Normal sinus rhythm  Normal ECG    Confirmed by CLARICE HUTTON MD ([id number]) on 4/2/2021 3:29:52 PM (04-02-21 @ 14:17)      MEDICATIONS  chlorhexidine 4% Liquid 1 Topical <User Schedule>  dexAMETHasone  Injectable 6 IV Push daily  dextrose 5%. 1000 IV Continuous <Continuous>  enoxaparin Injectable 40 SubCutaneous two times a day  glucagon  Injectable 1 IntraMuscular once  insulin lispro (ADMELOG) corrective regimen sliding scale  SubCutaneous three times a day before meals  multivitamin 1 Oral daily  pantoprazole    Tablet 40 Oral before breakfast      WEIGHT  Weight (kg): 102.6 (04-03-21 @ 11:21)      ANTIBIOTICS:      All available historical records have been reviewed

## 2021-04-09 NOTE — DIETITIAN INITIAL EVALUATION ADULT. - PERSON TAUGHT/METHOD
discussed elevated blood sugars. gave tips on CHO intake. declined written materials/verbal instruction/patient instructed

## 2021-04-09 NOTE — DIETITIAN INITIAL EVALUATION ADULT. - ORAL INTAKE PTA/DIET HISTORY
Spoke to pt via phone. Reports she ate fine today. Had good appetite PTA too. No chewing/swallowing difficulty. NKFA. Takes MVI sometimes. No cultural/Rastafari food restrictions.

## 2021-04-09 NOTE — PROGRESS NOTE ADULT - ATTENDING COMMENTS
Patient is a 45 year old Female  patient with no pertinent past medical Hx presented to the ED for shortness of breath of 1 week duration. dx. with hypoxia due to covid viral Pneumonia    #Acute hypoxic respiratory failure 2/2 COVID PNA + cytokine storm +transaminitis  - patient had increased oxygen requirements yesterday - currently on 15L NRB @93% . on ambulation went down to 87%. switch to HFNC for oral intake   - 1st positive on 03/28  - CXR 4/03 - w diffuse b/l opacities; +leukopenia.   - repeat CXR 4/06- improving  - crp 4/05 73>28 -13   - procal 4/02 -> .3  - ferritin 4/05 3872>2443,   - ddimer 4/05 167>>152  - s/p toci 4/3, plasma 4/4  - cont RDV for 5 days (day 1 4/3-4/7),  - continue dex for 10 days (day 1 4/3 4/9)  - Repeat inflamm markers q48  - wean O2 as tolerated    4/6: NRM. HF while she eats. c/w Dexa    DVT ppx: Lovenox 40 mg SubQ bid (BMI >30)   GI ppx: not recommended  Diet: Regular  Activity: IAT  FULL CODE    Pending: clinical improvement
Patient is a 45 year old Female  patient with no pertinent past medical Hx presented to the ED for shortness of breath of 1 week duration. dx. with hypoxia due to covid viral Pneumonia    #Acute hypoxic respiratory failure 2/2 COVID PNA + cytokine storm +transaminitis  - patient had increased oxygen requirements yesterday - currently on 15L NRB @93% . on ambulation went down to 87%. switch to HFNC for oral intake   - 1st positive on 03/28  - CXR 4/03 - w diffuse b/l opacities; +leukopenia.   - repeat CXR 4/06- improving  - crp 4/05 73>28 -13   - procal 4/02 -> .3  - ferritin 4/05 3872>2443,   - ddimer 4/05 167>>152  - s/p toci 4/3, plasma 4/4  - cont RDV for 5 days (day 1 4/3-4/7),  - continue dex for 10 days (day 1 4/3 4/9)  - Repeat inflamm markers q48  - wean O2 as tolerated    4/6: NRM. HF while she eats. c/w Dexa  4/7: NRM. HF while she eats. c/w Dexa. continue to taper O2 as tolerated.     DVT ppx: Lovenox 40 mg SubQ bid (BMI >30)   GI ppx: not recommended  Diet: Regular  Activity: IAT  FULL CODE    Pending: clinical improvement .
Patient is a 45 year old Female  patient with no pertinent past medical Hx presented to the ED for shortness of breath of 1 week duration. dx. with hypoxia due to covid viral Pneumonia    #Acute hypoxic respiratory failure 2/2 COVID PNA + cytokine storm +transaminitis  - patient had increased oxygen requirements yesterday - currently on 15L NRB @93% . on ambulation went down to 87%. switch to HFNC for oral intake   - 1st positive on 03/28  - CXR 4/03 - w diffuse b/l opacities; +leukopenia.   - repeat CXR 4/06- improving  - crp 4/05 73>28 -13   - procal 4/02 -> .3  - ferritin 4/05 3872>2443,   - ddimer 4/05 167>>152  - s/p toci 4/3, plasma 4/4  - cont RDV for 5 days (day 1 4/3-4/7),  - continue dex for 10 days (day 1 4/3 4/9)  - Repeat inflamm markers q48  - wean O2 as tolerated    4/6: NRM. HF while she eats. c/w Dexa  4/7: NRM. HF while she eats. c/w Dexa. continue to taper O2 as tolerated.   4/8: HFNC 40/70. s/p RDV. Dexa day 7. continue to taper O2 as tolerated.  4/9: HF 40/60. continue to taper O2 as tolerated.    DVT ppx: Lovenox 40 mg SubQ bid (BMI >30)   GI ppx: not recommended  Diet: Regular  Activity: IAT  FULL CODE    Dispo: Still on HFNC
Patient is a 45 year old Female  patient with no pertinent past medical Hx presented to the ED for shortness of breath of 1 week duration. dx. with hypoxia due to covid viral Pneumonia    #Acute hypoxic respiratory failure 2/2 COVID PNA + cytokine storm +transaminitis  - patient had increased oxygen requirements yesterday - currently on 15L NRB @93% . on ambulation went down to 87%. switch to HFNC for oral intake   - 1st positive on 03/28  - CXR 4/03 - w diffuse b/l opacities; +leukopenia.   - repeat CXR 4/06- improving  - crp 4/05 73>28 -13   - procal 4/02 -> .3  - ferritin 4/05 3872>2443,   - ddimer 4/05 167>>152  - s/p toci 4/3, plasma 4/4  - cont RDV for 5 days (day 1 4/3-4/7),  - continue dex for 10 days (day 1 4/3 4/9)  - Repeat inflamm markers q48  - wean O2 as tolerated    4/6: NRM. HF while she eats. c/w Dexa  4/7: NRM. HF while she eats. c/w Dexa. continue to taper O2 as tolerated.   4/8: HFNC 40/70. s/p RDV. Dexa day 7. continue to taper O2 as tolerated.    DVT ppx: Lovenox 40 mg SubQ bid (BMI >30)   GI ppx: not recommended  Diet: Regular  Activity: IAT  FULL CODE    Pending: clinical improvement .

## 2021-04-09 NOTE — PROGRESS NOTE ADULT - ASSESSMENT
ASSESSMENT  45 year old F patient with no pertinent past medical Hx presented to the ED for shortness of breath of 1 week duration.     IMPRESSION  #COVID19 PNA, Severe (O2 < or = 94% on RA and requiring supplemental O2)    CXR bilateral hazy opacities     Admission WBC 3    AB (-)  C-Reactive Protein, Serum: 73 mg/L (04-02-21 @ 14:20)  D-Dimer Assay, Quantitative: 167 ng/mL DDU (04-02-21 @ 14:20)  Ferritin, Serum: 3872 ng/mL (04-02-21 @ 14:20)  Procalcitonin, Serum: 0.30 ng/mL (04-02-21 @ 14:20)    #Sepsis on admission T>101F, Pulse>90  #Transaminitis     Creatinine, Serum: 0.7 (04-02-21 @ 14:20)      RECOMMENDATIONS  - s/p Remdesivir   - s/p Tocilizumab 4/3   - s/p 4/4 convalescent Plasma: 1 unit over 2h  (Please  patient- will not be able to receive vaccine x 90 days)  - Dexamethasone 6mg x 10 days or until Discharge (whichever is shorter), any taper per Pulm  - A/C per primary team  - Prone positioning if possible  - Monitor off Abx     If any questions, please call or send a message on [employer] Teams  Spectra [phone]

## 2021-04-09 NOTE — PROGRESS NOTE ADULT - TIME BILLING
D/w primary team
D/w primary team
complete bedside patient's assessment, review medical chart, discuss medical plan of care with covering medical team
complete bedside patient's assessment, review medical chart, discuss medical plan of care with patient.
D/w primary team
complete bedside patient's assessment, review medical chart, discuss medical plan of care with covering medical team

## 2021-04-09 NOTE — CHART NOTE - NSCHARTNOTESELECT_GEN_ALL_CORE
COVID Communication Team/Event Note
communication/Event Note

## 2021-04-09 NOTE — CHART NOTE - NSCHARTNOTEFT_GEN_A_CORE
I made rounds today with the treatment team including the hospitalist, residents,  nurses and  and discussed the patient's current medical status and discharge  planning needs, and reviewed the chart.    T(C): 36.8 (04-05-21 @ 05:30), Max: 36.8 (04-05-21 @ 05:30)  HR: 70 (04-05-21 @ 05:30) (70 - 75)  BP: 110/71 (04-05-21 @ 05:30) (103/59 - 110/71)  RR: 18 (04-05-21 @ 05:30) (18 - 18)  SpO2: 92% (04-05-21 @ 05:30) (91% - 96%)          I reached out to the patient's health care proxy/ responsible family member-           [     ]  I reached                      and discussed the patient's medical condition,                   family concerns, and discharge planning           [     ]  I left a message with family               [ x   ]  I personally participated in rounds with the medical team and my resident and discussed the case. My resident reached                   family member/ HCP        Magalys ( sister ) [phone]         under my direction and supervision  and we reviewed the conversation          [     ]  My resident left a message with family under my direction and supervision                [     ]   My resident attended rounds and called the family     The following was discussed: updated medical status          [     ] I spent 5-10 minutes on the above discussing medical issues with team members and family and/ or my resident    [  x   ] I spent 11-20 minutes on the above discussing medical issues with team members and family and/ or my resident    [     ] I spent 21-30 minutes on the above discussing medical issues with team members and family and/ or my resident
I made rounds today with the treatment team including the hospitalist, residents,  nurses, and discussed the patient's current medical status and discharge  planning needs, and reviewed the chart.    T(C): 35.6 (04-06-21 @ 12:19), Max: 37.1 (04-05-21 @ 21:00)  HR: 72 (04-06-21 @ 12:19) (69 - 72)  BP: 134/85 (04-06-21 @ 12:19) (111/65 - 135/86)  RR: 19 (04-06-21 @ 12:19) (18 - 19)  SpO2: 91% (04-06-21 @ 12:19) (87% - 94%)          I reached out to the patient's health care proxy/ responsible family member-           [     ]  I reached                                     and discussed the patient's medical condition,                   family concerns, and discharge planning           [     ]  I left a message with family               [    x ]  I personally participated in rounds with the medical team and my resident and discussed the case. My resident reached                   family member/ HCP  Magalys Lee                              under my direction and supervision  and we reviewed the conversation.          [     ]  My resident left a message with family under my direction and supervision           [     ]   My resident attended medical rounds and called the family                [ x     ]    The following was discussed:  The patient's medical status over the past 24 hrs was reviewed, as well as oxygen needs and medication changes and labs. Looks better today. On NRM. To try HF NC O2 today to allow eating. Chest x-ray improving.          [  x    ]   The following concerns were raised: none          [     ] I spent 5-10 minutes on the above discussing medical issues with team members and family and/ or my resident    [   x  ] I spent 11-20 minutes on the above discussing medical issues with team members and family and/ or my resident    [     ] I spent 21-30 minutes on the above discussing medical issues with team members and family and/ or my resident
I made rounds today with the treatment team including the hospitalist, residents,  nurses, and discussed the patient's current medical status and discharge  planning needs, and reviewed the chart.    T(C): 35.6 (04-09-21 @ 05:00), Max: 36.9 (04-08-21 @ 19:57)  HR: 65 (04-09-21 @ 07:54) (65 - 77)  BP: 110/76 (04-09-21 @ 05:00) (110/76 - 112/71)  RR: 18 (04-09-21 @ 05:00) (18 - 20)  SpO2: 95% (04-09-21 @ 10:42) (90% - 96%)          I reached out to the patient's health care proxy/ responsible family member-           [     ]  I reached                                     and discussed the patient's medical condition,                   family concerns, and discharge planning           [     ]  I left a message with family               [ x    ]  I personally participated in rounds with the medical team and my resident and discussed the case. My resident reached                   family member/ HCP      Magalys Gonzalez                          under my direction and supervision  and we reviewed the conversation.          [     ]  My resident left a message with family under my direction and supervision           [     ]   My resident attended medical rounds and called the family                [   x   ]    The following was discussed:  The patient's medical status over the past 24 hrs was reviewed, as well as oxygen needs and medication changes and labs. On Decadron. Chest x-ray improving. On HF NC O2 40/ 70. Continue to wean O2.          [  x    ]   The following concerns were raised: none          [     ] I spent 5-10 minutes on the above discussing medical issues with team members and family and/ or my resident    [  x   ] I spent 11-20 minutes on the above discussing medical issues with team members and family and/ or my resident    [     ] I spent 21-30 minutes on the above discussing medical issues with team members and family and/ or my resident
I made rounds today with the treatment team including the hospitalist, residents,  nurses, and discussed the patient's current medical status and discharge  planning needs, and reviewed the chart.    T(C): 36.9 (04-08-21 @ 12:12), Max: 36.9 (04-08-21 @ 12:12)  HR: 73 (04-08-21 @ 12:12) (60 - 74)  BP: 103/61 (04-08-21 @ 12:12) (100/59 - 108/72)  RR: 19 (04-08-21 @ 12:12) (19 - 19)  SpO2: 95% (04-08-21 @ 12:12) (92% - 98%)          I reached out to the patient's health care proxy/ responsible family member-           [     ]  I reached                                     and discussed the patient's medical condition,                   family concerns, and discharge planning           [     ]  I left a message with family               [  x   ]  I personally participated in rounds with the medical team and my resident and discussed the case. My resident reached                   family member/ HCP        Magalys Lee                        under my direction and supervision  and we reviewed the conversation.          [     ]  My resident left a message with family under my direction and supervision           [     ]   My resident attended medical rounds and called the family                [  x    ]    The following was discussed:  The patient's medical status over the past 24 hrs was reviewed, as well as oxygen needs and medication changes and labs. HF NC O2 decreased to 40/70 with 97% sat. Inflammatory markers improving. Eats. Able to walk to commode.          [    x  ]   The following concerns were raised: none          [     ] I spent 5-10 minutes on the above discussing medical issues with team members and family and/ or my resident    [ x    ] I spent 11-20 minutes on the above discussing medical issues with team members and family and/ or my resident    [     ] I spent 21-30 minutes on the above discussing medical issues with team members and family and/ or my resident
I made rounds today with the treatment team including the hospitalist, residents,  nurses, and discussed the patient's current medical status and discharge  planning needs, and reviewed the chart.    T(C): 36.7 (04-07-21 @ 12:26), Max: 36.8 (04-06-21 @ 21:00)  HR: 70 (04-07-21 @ 12:26) (64 - 88)  BP: 113/70 (04-07-21 @ 12:26) (112/68 - 119/81)  RR: 19 (04-07-21 @ 12:26) (18 - 21)  SpO2: 95% (04-07-21 @ 12:26) (94% - 96%)          I reached out to the patient's health care proxy/ responsible family member-           [     ]  I reached                                     and discussed the patient's medical condition,                   family concerns, and discharge planning           [     ]  I left a message with family               [  x   ]  I personally participated in rounds with the medical team and my resident and discussed the case. My resident reached                   family member/ HCP       Magalys Lee                         under my direction and supervision  and we reviewed the conversation.          [     ]  My resident left a message with family under my direction and supervision           [     ]   My resident attended medical rounds and called the family                [    x  ]    The following was discussed:  The patient's medical status over the past 24 hrs was reviewed, as well as oxygen needs and medication changes and labs. On HF NC O2 30/100. More mobile on HF. Decreased opacities on chest x-ray. Wean O2.          [  x    ]   The following concerns were raised: none          [     ] I spent 5-10 minutes on the above discussing medical issues with team members and family and/ or my resident    [   x  ] I spent 11-20 minutes on the above discussing medical issues with team members and family and/ or my resident    [     ] I spent 21-30 minutes on the above discussing medical issues with team members and family and/ or my resident

## 2021-04-09 NOTE — PROGRESS NOTE ADULT - SUBJECTIVE AND OBJECTIVE BOX
SOSA COLLADO 45y Female  MRN#: [id number]   Hospital Day: 7d    SUBJECTIVE  Patient is a 45y old Female who presents with a chief complaint of Shortness of breath (07 Apr 2021 12:26)  Currently admitted to medicine with the primary diagnosis of COVID-19      INTERVAL HPI AND OVERNIGHT EVENTS:  Patient was examined and seen at bedside. This morning she is resting comfortably in bed and reports no issues or overnight events. On High-flow 40L/70% saturating 97%.     REVIEW OF SYMPTOMS:  CONSTITUTIONAL: No weakness, fevers or chills; No headaches  EYES: No visual changes, eye pain, or discharge  ENT: No vertigo; No ear pain or change in hearing; No sore throat or difficulty swallowing  NECK: No pain or stiffness  RESPIRATORY: No cough, wheezing, or hemoptysis; No shortness of breath  CARDIOVASCULAR: No chest pain or palpitations  GASTROINTESTINAL: No abdominal or epigastric pain; No nausea, vomiting, or hematemesis; No diarrhea or constipation; No melena or hematochezia  GENITOURINARY: No dysuria, frequency or hematuria  MUSCULOSKELETAL: No joint pain, no muscle pain, no weakness  NEUROLOGICAL: No numbness or weakness  SKIN: No itching or rashes    OBJECTIVE  PAST MEDICAL & SURGICAL HISTORY  No pertinent past medical history      ALLERGIES:  No Known Allergies    MEDICATIONS:  STANDING MEDICATIONS  chlorhexidine 4% Liquid 1 Application(s) Topical <User Schedule>  dexAMETHasone  Injectable 6 milliGRAM(s) IV Push daily  dextrose 5%. 1000 milliLiter(s) IV Continuous <Continuous>  enoxaparin Injectable 40 milliGRAM(s) SubCutaneous two times a day  glucagon  Injectable 1 milliGRAM(s) IntraMuscular once  insulin lispro (ADMELOG) corrective regimen sliding scale   SubCutaneous three times a day before meals  multivitamin 1 Tablet(s) Oral daily  pantoprazole    Tablet 40 milliGRAM(s) Oral before breakfast    PRN MEDICATIONS  acetaminophen    Suspension .. 650 milliGRAM(s) Oral every 6 hours PRN  ibuprofen  Tablet. 600 milliGRAM(s) Oral every 6 hours PRN      VITAL SIGNS: Last 24 Hours  Vital Signs Last 24 Hrs  T(C): 35.6 (09 Apr 2021 05:00), Max: 36.9 (08 Apr 2021 12:12)  T(F): 96 (09 Apr 2021 05:00), Max: 98.5 (08 Apr 2021 12:12)  HR: 65 (09 Apr 2021 07:54) (65 - 77)  BP: 110/76 (09 Apr 2021 05:00) (103/61 - 112/71)  BP(mean): --  RR: 18 (09 Apr 2021 05:00) (18 - 20)  SpO2: 96% (09 Apr 2021 07:54) (90% - 96%)  LABS:                                     14.3   5.70  )-----------( 406      ( 09 Apr 2021 09:29 )             44.6     04-08    139  |  101  |  22<H>  ----------------------------<  197<H>  4.8   |  28  |  1.0    Ca    9.1      08 Apr 2021 06:16    TPro  6.3  /  Alb  3.7  /  TBili  0.4  /  DBili  x   /  AST  76<H>  /  ALT  103<H>  /  AlkPhos  56  04-08      RADIOLOGY:      PHYSICAL EXAM:  CONSTITUTIONAL: No acute distress, well-developed, well-groomed, AAOx3  HEAD: Atraumatic, normocephalic  EYES: EOM intact, PERRLA, conjunctiva and sclera clear  ENT: Supple, no masses, no thyromegaly, no bruits, no JVD; moist mucous membranes  PULMONARY: Diminished to auscultation bilaterally; no wheezes, rales, or rhonchi  CARDIOVASCULAR: Regular rate and rhythm; no murmurs, rubs, or gallops  GASTROINTESTINAL: Soft, non-tender, non-distended; bowel sounds present  MUSCULOSKELETAL: 2+ peripheral pulses; no clubbing, no cyanosis, no edema  NEUROLOGY: non-focal  SKIN: No rashes or lesions; warm and dry

## 2021-04-10 VITALS
OXYGEN SATURATION: 93 % | RESPIRATION RATE: 18 BRPM | SYSTOLIC BLOOD PRESSURE: 123 MMHG | DIASTOLIC BLOOD PRESSURE: 82 MMHG | HEART RATE: 81 BPM | TEMPERATURE: 96 F

## 2021-04-10 LAB
ALBUMIN SERPL ELPH-MCNC: 3.7 G/DL — SIGNIFICANT CHANGE UP (ref 3.5–5.2)
ALP SERPL-CCNC: 57 U/L — SIGNIFICANT CHANGE UP (ref 30–115)
ALT FLD-CCNC: 104 U/L — HIGH (ref 0–41)
ANION GAP SERPL CALC-SCNC: 14 MMOL/L — SIGNIFICANT CHANGE UP (ref 7–14)
AST SERPL-CCNC: 42 U/L — HIGH (ref 0–41)
BASOPHILS # BLD AUTO: 0.01 K/UL — SIGNIFICANT CHANGE UP (ref 0–0.2)
BASOPHILS NFR BLD AUTO: 0.2 % — SIGNIFICANT CHANGE UP (ref 0–1)
BILIRUB SERPL-MCNC: 0.6 MG/DL — SIGNIFICANT CHANGE UP (ref 0.2–1.2)
BUN SERPL-MCNC: 18 MG/DL — SIGNIFICANT CHANGE UP (ref 10–20)
CALCIUM SERPL-MCNC: 9.4 MG/DL — SIGNIFICANT CHANGE UP (ref 8.5–10.1)
CHLORIDE SERPL-SCNC: 95 MMOL/L — LOW (ref 98–110)
CO2 SERPL-SCNC: 25 MMOL/L — SIGNIFICANT CHANGE UP (ref 17–32)
CREAT SERPL-MCNC: 0.8 MG/DL — SIGNIFICANT CHANGE UP (ref 0.7–1.5)
CRP SERPL-MCNC: <3 MG/L — SIGNIFICANT CHANGE UP
D DIMER BLD IA.RAPID-MCNC: 244 NG/ML DDU — HIGH (ref 0–230)
EOSINOPHIL # BLD AUTO: 0.11 K/UL — SIGNIFICANT CHANGE UP (ref 0–0.7)
EOSINOPHIL NFR BLD AUTO: 2 % — SIGNIFICANT CHANGE UP (ref 0–8)
GLUCOSE BLDC GLUCOMTR-MCNC: 229 MG/DL — HIGH (ref 70–99)
GLUCOSE BLDC GLUCOMTR-MCNC: 392 MG/DL — HIGH (ref 70–99)
GLUCOSE SERPL-MCNC: 379 MG/DL — HIGH (ref 70–99)
HCT VFR BLD CALC: 43.1 % — SIGNIFICANT CHANGE UP (ref 37–47)
HGB BLD-MCNC: 13.9 G/DL — SIGNIFICANT CHANGE UP (ref 12–16)
IMM GRANULOCYTES NFR BLD AUTO: 2.7 % — HIGH (ref 0.1–0.3)
LDH SERPL L TO P-CCNC: 505 — HIGH (ref 50–242)
LYMPHOCYTES # BLD AUTO: 0.65 K/UL — LOW (ref 1.2–3.4)
LYMPHOCYTES # BLD AUTO: 11.6 % — LOW (ref 20.5–51.1)
MAGNESIUM SERPL-MCNC: 2.1 MG/DL — SIGNIFICANT CHANGE UP (ref 1.8–2.4)
MCHC RBC-ENTMCNC: 27.7 PG — SIGNIFICANT CHANGE UP (ref 27–31)
MCHC RBC-ENTMCNC: 32.3 G/DL — SIGNIFICANT CHANGE UP (ref 32–37)
MCV RBC AUTO: 85.9 FL — SIGNIFICANT CHANGE UP (ref 81–99)
MONOCYTES # BLD AUTO: 0.18 K/UL — SIGNIFICANT CHANGE UP (ref 0.1–0.6)
MONOCYTES NFR BLD AUTO: 3.2 % — SIGNIFICANT CHANGE UP (ref 1.7–9.3)
NEUTROPHILS # BLD AUTO: 4.5 K/UL — SIGNIFICANT CHANGE UP (ref 1.4–6.5)
NEUTROPHILS NFR BLD AUTO: 80.3 % — HIGH (ref 42.2–75.2)
NRBC # BLD: 0 /100 WBCS — SIGNIFICANT CHANGE UP (ref 0–0)
PLATELET # BLD AUTO: 403 K/UL — HIGH (ref 130–400)
POTASSIUM SERPL-MCNC: 5 MMOL/L — SIGNIFICANT CHANGE UP (ref 3.5–5)
POTASSIUM SERPL-SCNC: 5 MMOL/L — SIGNIFICANT CHANGE UP (ref 3.5–5)
PROT SERPL-MCNC: 6.2 G/DL — SIGNIFICANT CHANGE UP (ref 6–8)
RBC # BLD: 5.02 M/UL — SIGNIFICANT CHANGE UP (ref 4.2–5.4)
RBC # FLD: 12.9 % — SIGNIFICANT CHANGE UP (ref 11.5–14.5)
SODIUM SERPL-SCNC: 134 MMOL/L — LOW (ref 135–146)
WBC # BLD: 5.6 K/UL — SIGNIFICANT CHANGE UP (ref 4.8–10.8)
WBC # FLD AUTO: 5.6 K/UL — SIGNIFICANT CHANGE UP (ref 4.8–10.8)

## 2021-04-10 PROCEDURE — 99239 HOSP IP/OBS DSCHRG MGMT >30: CPT

## 2021-04-10 RX ORDER — ASPIRIN/CALCIUM CARB/MAGNESIUM 324 MG
1 TABLET ORAL
Qty: 30 | Refills: 0
Start: 2021-04-10 | End: 2021-05-09

## 2021-04-10 RX ADMIN — Medication 1 TABLET(S): at 11:29

## 2021-04-10 RX ADMIN — Medication 6 MILLIGRAM(S): at 05:47

## 2021-04-10 RX ADMIN — Medication 2: at 07:57

## 2021-04-10 RX ADMIN — CHLORHEXIDINE GLUCONATE 1 APPLICATION(S): 213 SOLUTION TOPICAL at 05:45

## 2021-04-10 RX ADMIN — PANTOPRAZOLE SODIUM 40 MILLIGRAM(S): 20 TABLET, DELAYED RELEASE ORAL at 05:46

## 2021-04-10 RX ADMIN — Medication 5: at 11:46

## 2021-04-10 RX ADMIN — ENOXAPARIN SODIUM 40 MILLIGRAM(S): 100 INJECTION SUBCUTANEOUS at 05:47

## 2021-04-10 NOTE — DISCHARGE NOTE PROVIDER - NSDCCPCAREPLAN_GEN_ALL_CORE_FT
PRINCIPAL DISCHARGE DIAGNOSIS  Diagnosis: COVID-19  Assessment and Plan of Treatment: You were admitted for shortness of breath and cough, it was found that you were positive for COVID-19 pneumonia. During your stay you required oxygen support as your oxygen levels were low, and were placed on a high-flow nasal cannula. Your SOB and symptoms improved due to addional oxygen support. During your stay you were evaluated by infecious disease doctors, were given Toci on 4/3, were given Convalescent Plasma on 4/4, completed a 5-day course of Remdesivir, and were given steroids, Dexamethasone, that you completed a 9-day course for. During your stay you wished to leave Against Medical Advice. We had discussed the risks of leaving AMA that could be worsening shortness of breath, hypoxia, dizziness, falls, and even death. Despite the risks you still wish the leave against medical advice. Please follow-up with your primary care doctor and do not hesitate to visit the ED or call 911 if you have worsening symptoms.   >>On discharge, please take Prednisone 40mg for 1 additional day. You will also be started on baby Aspirin 81mg, please take 1 tab once a day for 30 days.       PRINCIPAL DISCHARGE DIAGNOSIS  Diagnosis: COVID-19  Assessment and Plan of Treatment: You were admitted for shortness of breath and cough, it was found that you were positive for COVID-19 pneumonia. During your stay you required oxygen support as your oxygen levels were low, and were placed on a high-flow nasal cannula. Your SOB and symptoms improved due to addional oxygen support. During your stay you were evaluated by infecious disease doctors, were given Toci on 4/3, were given Convalescent Plasma on 4/4, completed a 5-day course of Remdesivir, and were given steroids, Dexamethasone, that you completed a 9-day course for. During your stay you wished to leave Against Medical Advice. We had discussed the risks of leaving AMA that could be worsening shortness of breath, hypoxia, dizziness, falls, and even death. Despite the risks you still wish the leave against medical advice. Please follow-up with your primary care doctor and do not hesitate to visit the ED or call 911 if you have worsening symptoms.   >>On discharge, please take Prednisone 40mg for 1 additional day. You will also be started on baby Aspirin 81mg, please take 1 tab once a day for 30 days.  Counselled multiple times that she is at risk for MI/hypoxic failure or other end organ damage if she goes AMA without O2.   Understands the risks  "Just want to get out of this place"   PCR + 4/2. Maintain Quarantine till 4/12. Prednisone per rec  Leaving AMA

## 2021-04-10 NOTE — DISCHARGE NOTE NURSING/CASE MANAGEMENT/SOCIAL WORK - PATIENT PORTAL LINK FT
You can access the FollowMyHealth Patient Portal offered by [facility] by registering at the following website: [email or web address]. By joining [facility]’s FollowMyHealth portal, you will also be able to view your health information using other applications (apps) compatible with our system.

## 2021-04-10 NOTE — PROGRESS NOTE ADULT - PROVIDER SPECIALTY LIST ADULT
Infectious Disease
Hospitalist
Internal Medicine
Infectious Disease
Internal Medicine
Internal Medicine
Infectious Disease
Internal Medicine

## 2021-04-10 NOTE — PROGRESS NOTE ADULT - SUBJECTIVE AND OBJECTIVE BOX
SOSA COLLADO 45y Female  MRN#: [id number]   Hospital Day: 8d    SUBJECTIVE  Patient is a 45y old Female who presents with a chief complaint of Shortness of breath (07 Apr 2021 12:26)  Currently admitted to medicine with the primary diagnosis of COVID-19      INTERVAL HPI AND OVERNIGHT EVENTS:  Patient was examined and seen at bedside. This morning she is resting comfortably in bed and reports no issues or overnight events. On High-flow 40L/50% saturating 94%, patient was attempted to be weaned off to 40L/40% but desaturated to 90%. Otherwise patient reports feeling well and would like to be discharged home.     REVIEW OF SYMPTOMS:  CONSTITUTIONAL: No weakness, fevers or chills; No headaches  EYES: No visual changes, eye pain, or discharge  ENT: No vertigo; No ear pain or change in hearing; No sore throat or difficulty swallowing  NECK: No pain or stiffness  RESPIRATORY: No cough, wheezing, or hemoptysis; No shortness of breath  CARDIOVASCULAR: No chest pain or palpitations  GASTROINTESTINAL: No abdominal or epigastric pain; No nausea, vomiting, or hematemesis; No diarrhea or constipation; No melena or hematochezia  GENITOURINARY: No dysuria, frequency or hematuria  MUSCULOSKELETAL: No joint pain, no muscle pain, no weakness  NEUROLOGICAL: No numbness or weakness  SKIN: No itching or rashes    OBJECTIVE  PAST MEDICAL & SURGICAL HISTORY  No pertinent past medical history      ALLERGIES:  No Known Allergies    MEDICATIONS:  STANDING MEDICATIONS  chlorhexidine 4% Liquid 1 Application(s) Topical <User Schedule>  dexAMETHasone  Injectable 6 milliGRAM(s) IV Push daily  dextrose 5%. 1000 milliLiter(s) IV Continuous <Continuous>  enoxaparin Injectable 40 milliGRAM(s) SubCutaneous two times a day  glucagon  Injectable 1 milliGRAM(s) IntraMuscular once  insulin lispro (ADMELOG) corrective regimen sliding scale   SubCutaneous three times a day before meals  multivitamin 1 Tablet(s) Oral daily  pantoprazole    Tablet 40 milliGRAM(s) Oral before breakfast    PRN MEDICATIONS  acetaminophen    Suspension .. 650 milliGRAM(s) Oral every 6 hours PRN  ibuprofen  Tablet. 600 milliGRAM(s) Oral every 6 hours PRN      VITAL SIGNS: Last 24 Hours  Vital Signs Last 24 Hrs  T(C): 36 (10 Apr 2021 05:57), Max: 36.2 (09 Apr 2021 12:38)  T(F): 96.8 (10 Apr 2021 05:57), Max: 97.1 (09 Apr 2021 12:38)  HR: 79 (10 Apr 2021 05:57) (75 - 117)  BP: 102/69 (10 Apr 2021 05:57) (102/69 - 128/83)  BP(mean): --  RR: 18 (10 Apr 2021 10:22) (18 - 19)  SpO2: 92% (10 Apr 2021 10:22) (91% - 98%)    LABS:                                    13.9   5.60  )-----------( 403      ( 10 Apr 2021 09:20 )             43.1     04-10    134<L>  |  95<L>  |  18  ----------------------------<  379<H>  5.0   |  25  |  0.8    Ca    9.4      10 Apr 2021 09:20  Mg     2.1     04-10    TPro  6.2  /  Alb  3.7  /  TBili  0.6  /  DBili  x   /  AST  42<H>  /  ALT  104<H>  /  AlkPhos  57  04-10      RADIOLOGY:      PHYSICAL EXAM:  CONSTITUTIONAL: No acute distress, well-developed, well-groomed, AAOx3  HEAD: Atraumatic, normocephalic  EYES: EOM intact, PERRLA, conjunctiva and sclera clear  ENT: Supple, no masses, no thyromegaly, no bruits, no JVD; moist mucous membranes  PULMONARY: Diminished to auscultation bilaterally; no wheezes, rales, or rhonchi  CARDIOVASCULAR: Regular rate and rhythm; no murmurs, rubs, or gallops  GASTROINTESTINAL: Soft, non-tender, non-distended; bowel sounds present  MUSCULOSKELETAL: 2+ peripheral pulses; no clubbing, no cyanosis, no edema  NEUROLOGY: non-focal  SKIN: No rashes or lesions; warm and dry

## 2021-04-10 NOTE — PROGRESS NOTE ADULT - ASSESSMENT
Patient is a 45 year old Female  patient with no pertinent past medical Hx presented to the ED for shortness of breath of 1 week duration. dx. with hypoxia due to covid viral Pneumonia.     #Acute hypoxic respiratory failure 2/2 COVID PNA + cytokine storm +transaminitis  - Patient had increased oxygen requirements (desaturated to 87%) on 4/6 from 15L NRB @93% > now on High-flow 40L/70% saturating 97%  - On 4/10 patient was on HF 40L/50% (attempted 40L/40% but desaturated to 90%)  - Covid positive on 03/28  - CXR 4/03 - w diffuse b/l opacities; +leukopenia.   - Repeat CXR 4/06- decreased b/l opacities   - Crp 4/05 73>28 -13   - Procal 4/02 -> .3  - Ferritin 4/05 3872>2443  - D-Dimer 4/05 167>>152  - S/p Toci 4/3, plasma 4/4  - S/p RDV (4/3-4/7)  - C/w Dex for 10 days (day 1 4/3 4/9) (Today is Day #9)  - Repeat inflamm markers q48  - Wean O2 as tolerated  - Incentive spirometer   - Patient wishes to go home today; explained that patient will need to titrate down to NC prior to d/c    #Transaminitis  - Likely 2/2 COVID PNA  - AST 76/ [id number]    #Hyperglycemia 2/2 steroid use  - fingersticks monitoring  - started on sliding scale  - , 189, 217    DVT ppx: Lovenox 40 mg SubQ bid (BMI >30)   GI ppx: not recommended  Diet: Regular  Activity: IAT  FULL CODE  Pending: clinical improvement

## 2021-04-10 NOTE — DISCHARGE NOTE PROVIDER - NSDCMRMEDTOKEN_GEN_ALL_CORE_FT
aspirin 81 mg oral delayed release tablet: 1 tab(s) orally once a day   predniSONE 20 mg oral tablet: 2 tab(s) orally once a day

## 2021-04-10 NOTE — PROGRESS NOTE ADULT - NSICDXPILOT_GEN_ALL_CORE
[place]

## 2021-04-10 NOTE — DISCHARGE NOTE PROVIDER - CARE PROVIDER_API CALL
Sera Urbanh  INTERNAL MEDICINE  [address], [address]  [place] [address]  Phone: [phone]  Fax: [phone]  Established Patient  Follow Up Time: 2 weeks

## 2021-04-10 NOTE — DISCHARGE NOTE PROVIDER - HOSPITAL COURSE
This is a 45 year old F patient with no pertinent past medical Hx presented to the ED for shortness of breath of 1 week duration. Patient was doing well until one week prior to admission when she started to experience exertional shortness of breath on friday 03/26/2021. Patient stated that the dyspnea got progressively worse but is mostly exertional. It was associated with fever (undocumented) and nonproductive cough. Patient was tested for COVID and it was positive two days after the onset of symptoms. she also experienced one episode of vomiting and was having diarrhea for 2 days. Patient denied any chest pain except when she has a spell of cough that resolve completely after the cough stops. Patient denied any lightheadedness, headache or any other symptoms. Patient went to Urgent care on the day of admission and was found to have SpO2 of 83% so she was sent to the ED for further management.     Patient was admitted for Acute hypoxic respiratory failure 2/2 COVID PNA + cytokine storm +transaminitis. Patient desaturated off the 15L NRB and was placed on High-flow 40L/70% saturating 97%. Patient had CXR w/ diffuse b/l opacities. Was evaluated by ID and was started on COVID theraputics. Patient received Toci 4/3, plasma 4/4, finished course of RDV 4/3-4/7 and was started on Dexamethasone 6mg and was on her 8th day of steroids. Today patient wished to leave AMA as she "feels great" despite ambulating and desaturating on room air to 80%. Explained the risks of leaving AMA which would include worsening SOB, hypoxia, falls, and even death. Despite the risks patient still wishes to leave AMA. Patient's medications will be courteusly sent to her pharmacy to complete her steroid course and ASA 81 for DVT ppx.     #Acute hypoxic respiratory failure 2/2 COVID PNA + cytokine storm +transaminitis  - Patient had increased oxygen requirements (desaturated to 87%) on 4/6 from 15L NRB @93% > now on High-flow 40L/70% saturating 97%, respiratory to titrate down today to 40L/40%  - Covid positive on 03/28  - CXR 4/03 - w diffuse b/l opacities; +leukopenia.   - Repeat CXR 4/06- decreased b/l opacities   - Crp 4/05 73>28 -13   - Procal 4/02 -> .3  - Ferritin 4/05 3872>2443  - D-Dimer 4/05 167>>152  - S/p Toci 4/3, plasma 4/4  - S/p RDV (4/3-4/7)  - C/w Dex for 10 days (day 1 4/3 4/9) (Today is Day #9)  - Repeat inflamm markers q48  >>Patient wishes to leave AMA despite desaturating on RA on ambulation to 80's. 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     #Transaminitis  - Likely 2/2 COVID PNA  - AST 76/ [id number]  - F/u outpatient PCP for CMP's                This is a 45 year old F patient with no pertinent past medical Hx presented to the ED for shortness of breath of 1 week duration. Patient was doing well until one week prior to admission when she started to experience exertional shortness of breath on friday 03/26/2021. Patient stated that the dyspnea got progressively worse but is mostly exertional. It was associated with fever (undocumented) and nonproductive cough. Patient was tested for COVID and it was positive two days after the onset of symptoms. she also experienced one episode of vomiting and was having diarrhea for 2 days. Patient denied any chest pain except when she has a spell of cough that resolve completely after the cough stops. Patient denied any lightheadedness, headache or any other symptoms. Patient went to Urgent care on the day of admission and was found to have SpO2 of 83% so she was sent to the ED for further management.     Patient was admitted for Acute hypoxic respiratory failure 2/2 COVID PNA + cytokine storm +transaminitis. Patient desaturated off the 15L NRB and was placed on High-flow 40L/70% saturating 97%. Patient had CXR w/ diffuse b/l opacities. Was evaluated by ID and was started on COVID theraputics. Patient received Toci 4/3, plasma 4/4, finished course of RDV 4/3-4/7 and was started on Dexamethasone 6mg and was on her 8th day of steroids. Today patient wished to leave AMA as she "feels great" despite ambulating and desaturating on room air to 80%. Explained the risks of leaving AMA which would include worsening SOB, hypoxia, falls, and even death. Despite the risks patient still wishes to leave AMA. Patient's medications will be courteusly sent to her pharmacy to complete her steroid course and ASA 81 for DVT ppx.     #Acute hypoxic respiratory failure 2/2 COVID PNA + cytokine storm +transaminitis  - Patient had increased oxygen requirements (desaturated to 87%) on 4/6 from 15L NRB @93% > now on High-flow 40L/70% saturating 97%, respiratory to titrate down today to 40L/40%  - Covid positive on 03/28  - CXR 4/03 - w diffuse b/l opacities; +leukopenia.   - Repeat CXR 4/06- decreased b/l opacities   - Crp 4/05 73>28 -13   - Procal 4/02 -> .3  - Ferritin 4/05 3872>2443  - D-Dimer 4/05 167>>152  - S/p Toci 4/3, plasma 4/4  - S/p RDV (4/3-4/7)  - C/w Dex for 10 days (day 1 4/3 4/9) (Today is Day #9)  - Repeat inflamm markers q48  >>Patient wishes to leave AMA despite desaturating on RA on ambulation to 80's. 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     #Transaminitis  - Likely 2/2 COVID PNA  - AST 76/ [id number]  - F/u outpatient PCP for CMP's       Attending Note:  Patient seen and examined independently. I personally had a face-to-face encounter with the patient, examined the patient myself and reviewed the plan of care with the housestaff. Agree with resident's note but my note supersedes that of the resident in the matters hereby listed.   Patient improved from HF to 6L NC. But desats to 80s on RA.   Counselled multiple times that she is at risk for MI/hypoxic failure or other end organ damage if she goes AMA without O2.   Understands the risks  "Just want to get out of this place"   PCR + 4/2. Maintain Quarantine till 4/12. Prednisone per rec  Leaving AMA

## 2021-04-15 DIAGNOSIS — D72.810 LYMPHOCYTOPENIA: ICD-10-CM

## 2021-04-15 DIAGNOSIS — J12.82 PNEUMONIA DUE TO CORONAVIRUS DISEASE 2019: ICD-10-CM

## 2021-04-15 DIAGNOSIS — A41.89 OTHER SPECIFIED SEPSIS: ICD-10-CM

## 2021-04-15 DIAGNOSIS — R73.9 HYPERGLYCEMIA, UNSPECIFIED: ICD-10-CM

## 2021-04-15 DIAGNOSIS — J96.01 ACUTE RESPIRATORY FAILURE WITH HYPOXIA: ICD-10-CM

## 2021-04-15 DIAGNOSIS — U07.1 COVID-19: ICD-10-CM

## 2021-04-15 DIAGNOSIS — R06.02 SHORTNESS OF BREATH: ICD-10-CM

## 2021-08-10 NOTE — PROGRESS NOTE ADULT - TIME-BASED BILLING (NON-CRITICAL CARE)
Detail Level: Simple
Time-based billing (NON-critical care)
Photo Preface (Leave Blank If You Do Not Want): Photographs were obtained today of all areas affected
Time-based billing (NON-critical care)

## 2021-09-18 ENCOUNTER — OUTPATIENT (OUTPATIENT)
Dept: OUTPATIENT SERVICES | Facility: HOSPITAL | Age: 46
LOS: 1 days | Discharge: HOME | End: 2021-09-18
Payer: COMMERCIAL

## 2021-09-18 DIAGNOSIS — R07.9 CHEST PAIN, UNSPECIFIED: ICD-10-CM

## 2021-09-18 PROCEDURE — 71046 X-RAY EXAM CHEST 2 VIEWS: CPT | Mod: 26

## 2021-09-18 PROCEDURE — 76700 US EXAM ABDOM COMPLETE: CPT | Mod: 26

## 2021-09-19 PROBLEM — Z78.9 OTHER SPECIFIED HEALTH STATUS: Chronic | Status: ACTIVE | Noted: 2021-04-02

## 2025-04-02 ENCOUNTER — NON-APPOINTMENT (OUTPATIENT)
Age: 50
End: 2025-04-02

## 2025-04-03 ENCOUNTER — APPOINTMENT (OUTPATIENT)
Dept: ORTHOPEDIC SURGERY | Facility: CLINIC | Age: 50
End: 2025-04-03
Payer: COMMERCIAL

## 2025-04-03 DIAGNOSIS — S93.601A UNSPECIFIED SPRAIN OF RIGHT FOOT, INITIAL ENCOUNTER: ICD-10-CM

## 2025-04-03 DIAGNOSIS — S93.491A SPRAIN OF OTHER LIGAMENT OF RIGHT ANKLE, INITIAL ENCOUNTER: ICD-10-CM

## 2025-04-03 PROBLEM — Z00.00 ENCOUNTER FOR PREVENTIVE HEALTH EXAMINATION: Status: ACTIVE | Noted: 2025-04-03

## 2025-04-03 PROCEDURE — 73610 X-RAY EXAM OF ANKLE: CPT | Mod: RT

## 2025-04-03 PROCEDURE — 99203 OFFICE O/P NEW LOW 30 MIN: CPT | Mod: 25

## 2025-04-03 PROCEDURE — L4361: CPT | Mod: RT

## 2025-04-03 PROCEDURE — 73630 X-RAY EXAM OF FOOT: CPT | Mod: RT

## 2025-04-24 ENCOUNTER — APPOINTMENT (OUTPATIENT)
Dept: ORTHOPEDIC SURGERY | Facility: CLINIC | Age: 50
End: 2025-04-24

## 2025-04-24 DIAGNOSIS — S93.601A UNSPECIFIED SPRAIN OF RIGHT FOOT, INITIAL ENCOUNTER: ICD-10-CM

## 2025-04-24 DIAGNOSIS — S93.491A SPRAIN OF OTHER LIGAMENT OF RIGHT ANKLE, INITIAL ENCOUNTER: ICD-10-CM

## 2025-04-24 PROCEDURE — 99202 OFFICE O/P NEW SF 15 MIN: CPT
